# Patient Record
Sex: MALE | Race: BLACK OR AFRICAN AMERICAN | NOT HISPANIC OR LATINO | URBAN - METROPOLITAN AREA
[De-identification: names, ages, dates, MRNs, and addresses within clinical notes are randomized per-mention and may not be internally consistent; named-entity substitution may affect disease eponyms.]

---

## 2023-02-01 ENCOUNTER — EMERGENCY (EMERGENCY)
Facility: HOSPITAL | Age: 48
LOS: 1 days | Discharge: HOME | End: 2023-02-01
Attending: STUDENT IN AN ORGANIZED HEALTH CARE EDUCATION/TRAINING PROGRAM | Admitting: STUDENT IN AN ORGANIZED HEALTH CARE EDUCATION/TRAINING PROGRAM
Payer: COMMERCIAL

## 2023-02-01 VITALS
TEMPERATURE: 98 F | RESPIRATION RATE: 16 BRPM | OXYGEN SATURATION: 100 % | SYSTOLIC BLOOD PRESSURE: 198 MMHG | DIASTOLIC BLOOD PRESSURE: 97 MMHG | HEIGHT: 70 IN | WEIGHT: 227.08 LBS | HEART RATE: 65 BPM

## 2023-02-01 VITALS
RESPIRATION RATE: 18 BRPM | TEMPERATURE: 98 F | HEART RATE: 61 BPM | OXYGEN SATURATION: 99 % | SYSTOLIC BLOOD PRESSURE: 188 MMHG | DIASTOLIC BLOOD PRESSURE: 62 MMHG

## 2023-02-01 DIAGNOSIS — E11.621 TYPE 2 DIABETES MELLITUS WITH FOOT ULCER: ICD-10-CM

## 2023-02-01 DIAGNOSIS — R21 RASH AND OTHER NONSPECIFIC SKIN ERUPTION: ICD-10-CM

## 2023-02-01 DIAGNOSIS — L97.519 NON-PRESSURE CHRONIC ULCER OF OTHER PART OF RIGHT FOOT WITH UNSPECIFIED SEVERITY: ICD-10-CM

## 2023-02-01 DIAGNOSIS — Z79.84 LONG TERM (CURRENT) USE OF ORAL HYPOGLYCEMIC DRUGS: ICD-10-CM

## 2023-02-01 DIAGNOSIS — Z20.822 CONTACT WITH AND (SUSPECTED) EXPOSURE TO COVID-19: ICD-10-CM

## 2023-02-01 DIAGNOSIS — I10 ESSENTIAL (PRIMARY) HYPERTENSION: ICD-10-CM

## 2023-02-01 DIAGNOSIS — Z89.422 ACQUIRED ABSENCE OF OTHER LEFT TOE(S): ICD-10-CM

## 2023-02-01 LAB
ALBUMIN SERPL ELPH-MCNC: 4 G/DL — SIGNIFICANT CHANGE UP (ref 3.3–5)
ALP SERPL-CCNC: 65 U/L — SIGNIFICANT CHANGE UP (ref 40–120)
ALT FLD-CCNC: 6 U/L — LOW (ref 10–45)
ANION GAP SERPL CALC-SCNC: 6 MMOL/L — SIGNIFICANT CHANGE UP (ref 5–17)
APTT BLD: 33 SEC — SIGNIFICANT CHANGE UP (ref 27.5–35.5)
AST SERPL-CCNC: 12 U/L — SIGNIFICANT CHANGE UP (ref 10–40)
BASOPHILS # BLD AUTO: 0.04 K/UL — SIGNIFICANT CHANGE UP (ref 0–0.2)
BASOPHILS NFR BLD AUTO: 0.6 % — SIGNIFICANT CHANGE UP (ref 0–2)
BILIRUB SERPL-MCNC: 0.2 MG/DL — SIGNIFICANT CHANGE UP (ref 0.2–1.2)
BUN SERPL-MCNC: 11 MG/DL — SIGNIFICANT CHANGE UP (ref 7–23)
CALCIUM SERPL-MCNC: 9 MG/DL — SIGNIFICANT CHANGE UP (ref 8.4–10.5)
CHLORIDE SERPL-SCNC: 103 MMOL/L — SIGNIFICANT CHANGE UP (ref 96–108)
CO2 SERPL-SCNC: 30 MMOL/L — SIGNIFICANT CHANGE UP (ref 22–31)
CREAT SERPL-MCNC: 0.84 MG/DL — SIGNIFICANT CHANGE UP (ref 0.5–1.3)
CRP SERPL-MCNC: <3 MG/L — SIGNIFICANT CHANGE UP (ref 0–4)
EGFR: 108 ML/MIN/1.73M2 — SIGNIFICANT CHANGE UP
EOSINOPHIL # BLD AUTO: 0.05 K/UL — SIGNIFICANT CHANGE UP (ref 0–0.5)
EOSINOPHIL NFR BLD AUTO: 0.8 % — SIGNIFICANT CHANGE UP (ref 0–6)
ERYTHROCYTE [SEDIMENTATION RATE] IN BLOOD: 20 MM/HR — HIGH
GLUCOSE SERPL-MCNC: 190 MG/DL — HIGH (ref 70–99)
HCT VFR BLD CALC: 37.3 % — LOW (ref 39–50)
HGB BLD-MCNC: 11.2 G/DL — LOW (ref 13–17)
IMM GRANULOCYTES NFR BLD AUTO: 0.2 % — SIGNIFICANT CHANGE UP (ref 0–0.9)
INR BLD: 1.15 — SIGNIFICANT CHANGE UP (ref 0.88–1.16)
LYMPHOCYTES # BLD AUTO: 1.28 K/UL — SIGNIFICANT CHANGE UP (ref 1–3.3)
LYMPHOCYTES # BLD AUTO: 20.7 % — SIGNIFICANT CHANGE UP (ref 13–44)
MCHC RBC-ENTMCNC: 23.1 PG — LOW (ref 27–34)
MCHC RBC-ENTMCNC: 30 GM/DL — LOW (ref 32–36)
MCV RBC AUTO: 76.9 FL — LOW (ref 80–100)
MONOCYTES # BLD AUTO: 0.29 K/UL — SIGNIFICANT CHANGE UP (ref 0–0.9)
MONOCYTES NFR BLD AUTO: 4.7 % — SIGNIFICANT CHANGE UP (ref 2–14)
NEUTROPHILS # BLD AUTO: 4.5 K/UL — SIGNIFICANT CHANGE UP (ref 1.8–7.4)
NEUTROPHILS NFR BLD AUTO: 73 % — SIGNIFICANT CHANGE UP (ref 43–77)
NRBC # BLD: 0 /100 WBCS — SIGNIFICANT CHANGE UP (ref 0–0)
PLATELET # BLD AUTO: 275 K/UL — SIGNIFICANT CHANGE UP (ref 150–400)
POTASSIUM SERPL-MCNC: 3.8 MMOL/L — SIGNIFICANT CHANGE UP (ref 3.5–5.3)
POTASSIUM SERPL-SCNC: 3.8 MMOL/L — SIGNIFICANT CHANGE UP (ref 3.5–5.3)
PROT SERPL-MCNC: 8 G/DL — SIGNIFICANT CHANGE UP (ref 6–8.3)
PROTHROM AB SERPL-ACNC: 13.7 SEC — HIGH (ref 10.5–13.4)
RBC # BLD: 4.85 M/UL — SIGNIFICANT CHANGE UP (ref 4.2–5.8)
RBC # FLD: 14.2 % — SIGNIFICANT CHANGE UP (ref 10.3–14.5)
SARS-COV-2 RNA SPEC QL NAA+PROBE: NEGATIVE — SIGNIFICANT CHANGE UP
SODIUM SERPL-SCNC: 139 MMOL/L — SIGNIFICANT CHANGE UP (ref 135–145)
WBC # BLD: 6.17 K/UL — SIGNIFICANT CHANGE UP (ref 3.8–10.5)
WBC # FLD AUTO: 6.17 K/UL — SIGNIFICANT CHANGE UP (ref 3.8–10.5)

## 2023-02-01 PROCEDURE — 73630 X-RAY EXAM OF FOOT: CPT

## 2023-02-01 PROCEDURE — 99284 EMERGENCY DEPT VISIT MOD MDM: CPT

## 2023-02-01 PROCEDURE — 85610 PROTHROMBIN TIME: CPT

## 2023-02-01 PROCEDURE — 99285 EMERGENCY DEPT VISIT HI MDM: CPT

## 2023-02-01 PROCEDURE — 36415 COLL VENOUS BLD VENIPUNCTURE: CPT

## 2023-02-01 PROCEDURE — 86140 C-REACTIVE PROTEIN: CPT

## 2023-02-01 PROCEDURE — 73610 X-RAY EXAM OF ANKLE: CPT

## 2023-02-01 PROCEDURE — 73610 X-RAY EXAM OF ANKLE: CPT | Mod: 26,LT

## 2023-02-01 PROCEDURE — 85025 COMPLETE CBC W/AUTO DIFF WBC: CPT

## 2023-02-01 PROCEDURE — 82962 GLUCOSE BLOOD TEST: CPT

## 2023-02-01 PROCEDURE — 85730 THROMBOPLASTIN TIME PARTIAL: CPT

## 2023-02-01 PROCEDURE — 73630 X-RAY EXAM OF FOOT: CPT | Mod: 26,LT

## 2023-02-01 PROCEDURE — 87040 BLOOD CULTURE FOR BACTERIA: CPT

## 2023-02-01 PROCEDURE — 80053 COMPREHEN METABOLIC PANEL: CPT

## 2023-02-01 PROCEDURE — 85652 RBC SED RATE AUTOMATED: CPT

## 2023-02-01 PROCEDURE — 87635 SARS-COV-2 COVID-19 AMP PRB: CPT

## 2023-02-01 NOTE — ED ADULT NURSE REASSESSMENT NOTE - NS ED NURSE REASSESS COMMENT FT1
patient discharged to follow up with podiatry.  pt and wife requesting diabetic medication sent to pharmacy.  pt and wife patient discharged to follow up with podiatry.  pt and wife requesting diabetic medication sent to pharmacy.  pt and wife instructed that pt would need to contact MD that prescribed diabetic medication, ER MDs cannot prescribe unless pt was treated here for such.

## 2023-02-01 NOTE — ED ADULT TRIAGE NOTE - NS ED TRIAGE AVPU SCALE
Alert-The patient is alert, awake and responds to voice. The patient is oriented to time, place, and person. The triage nurse is able to obtain subjective information. Hemostasis: Electrocautery

## 2023-02-01 NOTE — ED PROVIDER NOTE - OBJECTIVE STATEMENT
47M c PMH of NIDDM (hasn't taken home meds in weeks) 47M c PMH of NIDDM (hasn't taken home meds in weeks), hx of DM foot ulcer (s/p toe amputation of L foot toe) p/w acute on chronic ulceration of R foot. Pt states he bumped his L foot on an object several months ago and since then has had ulceration of his L foot. Has not seen a podiatrist. Does not have pmd. denies f/c. is not on abx.

## 2023-02-01 NOTE — ED PROVIDER NOTE - NSFOLLOWUPINSTRUCTIONS_ED_ALL_ED_FT
Follow-up at [facility] within 1-2 weeks of discharge.   Address: [address] ([address] Floor, [place] [address].   Phone: [phone]  Please call for an appointment.    Please reach out to Danitza Yeager ([facility] ED clinical referral coordinator) to assist you with your follow-up appointment with a PRIMARY CARE DOCTOR.     Monday - Friday 11am-7pm  [phone]  [email or web address]     1. You were seen for foot pain. A copy of any of your resulted labs, imaging, and findings have been provided to you. Make sure to view any test results that may not have yet resulted at the time of your discharge by creating a FollowMyHealth account at: [email or web address] to sign up for FollowMyHealth. Please review all of your lab, imaging, and all other results in their entirety with your primary care doctor.   2. Continue to take your home medications as prescribed.   3. Follow up with your primary care doctor within 48 hours to assess the symptoms you were seen for in the emergency department and to review all results from your visit. If you don't have a doctor, call [phone] to make an appointment.  4. Return immediately to the emergency department for new, persistent, or worsening symptoms or signs. Return immediately to the emergency department if you have chest pain, shortness of breath, loss of consciousness, fever, or worsening pain.   5. For your for health, you should make healthy food choices and be physically active. Also, you should not smoke or use drugs, and you should not drink alcohol in excess. Please visit [email or web address] for resources and more information.

## 2023-02-01 NOTE — ED PROVIDER NOTE - PHYSICAL EXAMINATION
BLE: 5/5 motor strength bilat hip flexors, knee flexors and extensors, plantar and dorsiflexors, hallux flexors and extensors. sensation intact to light touch bilat L3-S1; 2+ DP pulses bilat; compartment soft  R foot: 1x1 cm area of ulceration on plantar surface under R toe with minimal surrounding white granulomatous tissue, no active exudate, no gangrene/warmth/fluctuance/induration

## 2023-02-01 NOTE — ED ADULT NURSE NOTE - NSIMPLEMENTINTERV_GEN_ALL_ED
Implemented All Universal Safety Interventions:  [place] to call system. Call bell, personal items and telephone within reach. Instruct patient to call for assistance. Room bathroom lighting operational. Non-slip footwear when patient is off stretcher. Physically safe environment: no spills, clutter or unnecessary equipment. Stretcher in lowest position, wheels locked, appropriate side rails in place.

## 2023-02-01 NOTE — ED ADULT TRIAGE NOTE - CHIEF COMPLAINT QUOTE
Pt presents to the ED c/o wound to left foot that began a few months ago, worsening over the past couple of days. Denies fever, chills, drainage.

## 2023-02-01 NOTE — ED PROVIDER NOTE - PATIENT PORTAL LINK FT
You can access the FollowMyHealth Patient Portal offered by [facility] by registering at the following website: [email or web address]. By joining [facility]’s FollowMyHealth portal, you will also be able to view your health information using other applications (apps) compatible with our system.

## 2023-02-01 NOTE — CONSULT NOTE ADULT - SUBJECTIVE AND OBJECTIVE BOX
Attending: Dr. Ya     Patient is a 47y old  Male who presents with a chief complaint of left foot wound    HPI: This is a 46y/o male pmhx DM2, HTN, non-compliant with medications presents to ED at the behest of his family member at bedside who states she is concerned about his foot because he does not take care of it. Pt states he had his 2nd toe on his left foot amputation 2 years ago for infection and then 1 year ago at [facility] they removed part of his 3rd toe for infection. He never followed up with his podiatrist after the surgery and the suture on the top of his left foot is still there. He believes the wound under his left foot has been there for "awhile" but he has not done anything about it because he has been working too much. Pt admits he is supposed to take metformin and a medication for his blood pressure but he stopped because he ran out of medication and did not follow up with his PCP. Pt denies any drainage from the wound, denies pain to the left foot. He states he feels well and denies any N/V/F/C/SOB.       Review of systems negative except per HPI and as stated below  General:	 no weakness; no fevers, no chills  Skin/Breast: no rash  Respiratory and Thorax: no SOB, no cough  Cardiovascular:	No chest pain  Gastrointestinal:	 no nausea, vomiting , diarrhea  Genitourinary:	no dysuria, no difficulty urinating, no hematuria  Musculoskeletal:	no weakness, no joint swelling/pain  Neurological:	no focal weakness/numbness  Endocrine:	no polyuria, no polydipsia    PAST MEDICAL & SURGICAL HISTORY:    Home Medications:    Allergies    No Known Allergies    Intolerances      FAMILY HISTORY: DM type 2 mother     Social History: Admits to marijuana use, denies tobacco use, admits to drinking ETOH socially       LABS                        11.2   6.17  )-----------( 275      ( 01 Feb 2023 18:26 )             37.3     02-01    139  |  103  |  11  ----------------------------<  190<H>  3.8   |  30  |  0.84    Ca    9.0      01 Feb 2023 18:26    TPro  8.0  /  Alb  4.0  /  TBili  0.2  /  DBili  x   /  AST  12  /  ALT  6<L>  /  AlkPhos  65  02-01    PT/INR - ( 01 Feb 2023 18:26 )   PT: 13.7 sec;   INR: 1.15          PTT - ( 01 Feb 2023 18:26 )  PTT:33.0 sec    Vital Signs Last 24 Hrs  T(C): 36.6 (01 Feb 2023 19:58), Max: 36.9 (01 Feb 2023 17:07)  T(F): 97.9 (01 Feb 2023 19:58), Max: 98.4 (01 Feb 2023 17:07)  HR: 59 (01 Feb 2023 19:58) (59 - 65)  BP: 188/93 (01 Feb 2023 19:58) (188/93 - 198/97)  BP(mean): --  RR: 18 (01 Feb 2023 19:58) (16 - 18)  SpO2: 100% (01 Feb 2023 19:58) (100% - 100%)    Parameters below as of 01 Feb 2023 19:58  Patient On (Oxygen Delivery Method): room air        PHYSICAL EXAM  General: NAD, AA0x3  Lower Extremity Focused:  Vasc: DP 2/4 b/l, PT 1/4 b/l, WWP, no peripheral edema   Derm: left foot 2X2cm sub-met 1 wound, granular base, neg PTB, sanguinous drainage, no malodor, no erythema, wound dose not tunnel, hyperkeratotic border, left foot Hyperkeratotic lesion 1st IPJ-no underlying wound s/p debridement left foot-1 nylon suture to dorsal surface of 3rd metatarsal     Neuro: Sensation diminished   MSK: 5/5 muscle strength in all compartments, S/P left 2nd digit amputation at level of MPJ, no tenderness to left foot       RADIOLOGY: Left foot x ray: WET READ: No soft tissue gas, no cortical erosions, s/p left 2nd digit amputation at MPJ, s/p left 3rd met head resection

## 2023-02-01 NOTE — ED PROVIDER NOTE - CLINICAL SUMMARY MEDICAL DECISION MAKING FREE TEXT BOX
47M c PMH of NIDDM (hasn't taken home meds in weeks), hx of DM foot ulcer (s/p toe amputation of L foot toe) p/w acute on chronic ulceration of R foot. On exam, /62, VS otherwise wnl, ulceration on R plantar foot, BLE are NVI.     ddx: dm foot ulcer vs osteo vs cellulitis vs gangrene.     Plan:  - labs, wbc wnl, esr 20  - xr: no free air/focal  lesion  - podiatry consulted and saw pt in ED. states ulcer appears chronic and does not need abx or admission. was debrided at bedside. pt can be discharged home with podiatry f/u

## 2023-02-01 NOTE — ED ADULT NURSE NOTE - OBJECTIVE STATEMENT
46yo male hx presents with long term wound of L foot that recently began to drain. denies fever/chills

## 2023-02-01 NOTE — CONSULT NOTE ADULT - ASSESSMENT
This is a 48y/o male pmhx DM2, HTN, non-compliant with medications presents to ED for chronic left foot wound. Podiatry consulted for wound evaluation and to secure follow up. VSS, wbc 6.17, CRP <3.0, PE wound is superficial and does not show any signs of infection at this time, X rays negative for soft tissue gas, low suspicion of osteomyelitis.     Plan:   -Pt seen and x rays reviewed   -Aseptic excisional debridement of left foot wound and hyperkeratotic lesion down to and including level of bleeding dermis.   -Wound flushed with saline and dressed with wet to dry dressing   -Pt educated on importance of diabetes management and diabetic foot care  -Pt understands he should clean the wound daily with water and apply clean dressing   -Pt states he understands the importance of following up outpt with podiatrist for routine foot care  -Pt instructed to return to ED if he sees any signs of infection such as but not limited to erythema, edema, drainage fever, chills, nausea, vomiting, SOB, increased pain   Recommend pt follow up with PCP to ensure diabetes is being managed appropriately   -Pt should follow up at [address] [place] [address] in 1-2 weeks     Follow-up at [facility] within 1-2 weeks of discharge.   Address: [address] [place] [address] ([address] Floor, [place] [address].   Phone: [phone]  Please call for an appointment.   This is a 48y/o male pmhx DM2, HTN, non-compliant with medications presents to ED for chronic left foot wound. Podiatry consulted for wound evaluation and to secure follow up. VSS, wbc 6.17, CRP <3.0, PE wound is superficial and does not show any signs of infection at this time, X rays negative for soft tissue gas, low suspicion of osteomyelitis.     Plan:   -Pt seen and x rays reviewed   -Aseptic excisional debridement of left foot wound and hyperkeratotic lesion down to and including level of bleeding dermis.   -Wound flushed with saline and dressed with wet to dry dressing   -Pt educated on importance of diabetes management and diabetic foot care  -Pt understands he should clean the wound daily with water and apply clean dressing   -Pt states he understands the importance of following up outpt with podiatrist for routine foot care  -Pt instructed to return to ED if he sees any signs of infection such as but not limited to erythema, edema, drainage fever, chills, nausea, vomiting, SOB, increased pain   Recommend pt follow up with PCP to ensure diabetes is being managed appropriately   -Pt should follow up at [address] [place] [address] in 1-2 weeks     Follow-up at [facility] within 1-2 weeks of discharge.   Address: [address] [place] [address] ([address], [place] [address].   Phone: [phone]  Please call for an appointment.    Plan D/W attending

## 2023-02-06 LAB
CULTURE RESULTS: SIGNIFICANT CHANGE UP
CULTURE RESULTS: SIGNIFICANT CHANGE UP
SPECIMEN SOURCE: SIGNIFICANT CHANGE UP
SPECIMEN SOURCE: SIGNIFICANT CHANGE UP

## 2023-04-25 ENCOUNTER — FORM ENCOUNTER (OUTPATIENT)
Age: 48
End: 2023-04-25

## 2023-04-25 ENCOUNTER — INPATIENT (INPATIENT)
Facility: HOSPITAL | Age: 48
LOS: 2 days | Discharge: HOME | DRG: 41 | End: 2023-04-28
Attending: PSYCHIATRY & NEUROLOGY | Admitting: PSYCHIATRY & NEUROLOGY
Payer: COMMERCIAL

## 2023-04-25 VITALS
RESPIRATION RATE: 18 BRPM | HEIGHT: 69 IN | SYSTOLIC BLOOD PRESSURE: 224 MMHG | TEMPERATURE: 98 F | DIASTOLIC BLOOD PRESSURE: 100 MMHG | WEIGHT: 169.54 LBS | HEART RATE: 67 BPM | OXYGEN SATURATION: 97 %

## 2023-04-25 PROBLEM — E11.9 TYPE 2 DIABETES MELLITUS WITHOUT COMPLICATIONS: Chronic | Status: ACTIVE | Noted: 2023-02-01

## 2023-04-25 LAB
ALBUMIN SERPL ELPH-MCNC: 3.6 G/DL — SIGNIFICANT CHANGE UP (ref 3.3–5)
ALP SERPL-CCNC: 56 U/L — SIGNIFICANT CHANGE UP (ref 40–120)
ALT FLD-CCNC: <5 U/L — LOW (ref 10–45)
AMPHET UR-MCNC: NEGATIVE — SIGNIFICANT CHANGE UP
ANION GAP SERPL CALC-SCNC: 11 MMOL/L — SIGNIFICANT CHANGE UP (ref 5–17)
APTT BLD: 25.2 SEC — LOW (ref 27.5–35.5)
AST SERPL-CCNC: 9 U/L — LOW (ref 10–40)
BARBITURATES UR SCN-MCNC: NEGATIVE — SIGNIFICANT CHANGE UP
BASOPHILS # BLD AUTO: 0.02 K/UL — SIGNIFICANT CHANGE UP (ref 0–0.2)
BASOPHILS NFR BLD AUTO: 0.2 % — SIGNIFICANT CHANGE UP (ref 0–2)
BENZODIAZ UR-MCNC: NEGATIVE — SIGNIFICANT CHANGE UP
BILIRUB SERPL-MCNC: 0.2 MG/DL — SIGNIFICANT CHANGE UP (ref 0.2–1.2)
BUN SERPL-MCNC: 15 MG/DL — SIGNIFICANT CHANGE UP (ref 7–23)
CALCIUM SERPL-MCNC: 8.2 MG/DL — LOW (ref 8.4–10.5)
CHLORIDE SERPL-SCNC: 100 MMOL/L — SIGNIFICANT CHANGE UP (ref 96–108)
CHOLEST SERPL-MCNC: 144 MG/DL — SIGNIFICANT CHANGE UP
CO2 SERPL-SCNC: 25 MMOL/L — SIGNIFICANT CHANGE UP (ref 22–31)
COCAINE METAB.OTHER UR-MCNC: NEGATIVE — SIGNIFICANT CHANGE UP
CREAT SERPL-MCNC: 0.88 MG/DL — SIGNIFICANT CHANGE UP (ref 0.5–1.3)
EGFR: 106 ML/MIN/1.73M2 — SIGNIFICANT CHANGE UP
EOSINOPHIL # BLD AUTO: 0.01 K/UL — SIGNIFICANT CHANGE UP (ref 0–0.5)
EOSINOPHIL NFR BLD AUTO: 0.1 % — SIGNIFICANT CHANGE UP (ref 0–6)
ETHANOL SERPL-MCNC: <10 MG/DL — SIGNIFICANT CHANGE UP (ref 0–10)
GLUCOSE BLDC GLUCOMTR-MCNC: 178 MG/DL — HIGH (ref 70–99)
GLUCOSE SERPL-MCNC: 310 MG/DL — HIGH (ref 70–99)
HCT VFR BLD CALC: 34.6 % — LOW (ref 39–50)
HDLC SERPL-MCNC: 52 MG/DL — SIGNIFICANT CHANGE UP
HGB BLD-MCNC: 10.5 G/DL — LOW (ref 13–17)
IMM GRANULOCYTES NFR BLD AUTO: 0.4 % — SIGNIFICANT CHANGE UP (ref 0–0.9)
INR BLD: 1.11 — SIGNIFICANT CHANGE UP (ref 0.88–1.16)
LIPID PNL WITH DIRECT LDL SERPL: 81 MG/DL — SIGNIFICANT CHANGE UP
LYMPHOCYTES # BLD AUTO: 0.57 K/UL — LOW (ref 1–3.3)
LYMPHOCYTES # BLD AUTO: 4.9 % — LOW (ref 13–44)
MCHC RBC-ENTMCNC: 23.5 PG — LOW (ref 27–34)
MCHC RBC-ENTMCNC: 30.3 GM/DL — LOW (ref 32–36)
MCV RBC AUTO: 77.6 FL — LOW (ref 80–100)
METHADONE UR-MCNC: NEGATIVE — SIGNIFICANT CHANGE UP
MONOCYTES # BLD AUTO: 0.29 K/UL — SIGNIFICANT CHANGE UP (ref 0–0.9)
MONOCYTES NFR BLD AUTO: 2.5 % — SIGNIFICANT CHANGE UP (ref 2–14)
NEUTROPHILS # BLD AUTO: 10.77 K/UL — HIGH (ref 1.8–7.4)
NEUTROPHILS NFR BLD AUTO: 91.9 % — HIGH (ref 43–77)
NON HDL CHOLESTEROL: 92 MG/DL — SIGNIFICANT CHANGE UP
NRBC # BLD: 0 /100 WBCS — SIGNIFICANT CHANGE UP (ref 0–0)
OPIATES UR-MCNC: NEGATIVE — SIGNIFICANT CHANGE UP
PCP SPEC-MCNC: SIGNIFICANT CHANGE UP
PCP UR-MCNC: NEGATIVE — SIGNIFICANT CHANGE UP
PLATELET # BLD AUTO: 192 K/UL — SIGNIFICANT CHANGE UP (ref 150–400)
POTASSIUM SERPL-MCNC: 4.1 MMOL/L — SIGNIFICANT CHANGE UP (ref 3.5–5.3)
POTASSIUM SERPL-SCNC: 4.1 MMOL/L — SIGNIFICANT CHANGE UP (ref 3.5–5.3)
PROT SERPL-MCNC: 6.7 G/DL — SIGNIFICANT CHANGE UP (ref 6–8.3)
PROTHROM AB SERPL-ACNC: 13.2 SEC — SIGNIFICANT CHANGE UP (ref 10.5–13.4)
RBC # BLD: 4.46 M/UL — SIGNIFICANT CHANGE UP (ref 4.2–5.8)
RBC # FLD: 14.3 % — SIGNIFICANT CHANGE UP (ref 10.3–14.5)
SODIUM SERPL-SCNC: 136 MMOL/L — SIGNIFICANT CHANGE UP (ref 135–145)
THC UR QL: POSITIVE
TRIGL SERPL-MCNC: 57 MG/DL — SIGNIFICANT CHANGE UP
TROPONIN T SERPL-MCNC: 0.01 NG/ML — SIGNIFICANT CHANGE UP (ref 0–0.01)
WBC # BLD: 11.71 K/UL — HIGH (ref 3.8–10.5)
WBC # FLD AUTO: 11.71 K/UL — HIGH (ref 3.8–10.5)

## 2023-04-25 PROCEDURE — 70498 CT ANGIOGRAPHY NECK: CPT | Mod: 26,MA

## 2023-04-25 PROCEDURE — 71045 X-RAY EXAM CHEST 1 VIEW: CPT | Mod: 26

## 2023-04-25 PROCEDURE — 70450 CT HEAD/BRAIN W/O DYE: CPT | Mod: 26,MA

## 2023-04-25 PROCEDURE — 0042T: CPT | Mod: MA

## 2023-04-25 PROCEDURE — 70496 CT ANGIOGRAPHY HEAD: CPT | Mod: 26,MA

## 2023-04-25 PROCEDURE — 99285 EMERGENCY DEPT VISIT HI MDM: CPT

## 2023-04-25 RX ORDER — ATORVASTATIN CALCIUM 80 MG/1
80 TABLET, FILM COATED ORAL AT BEDTIME
Refills: 0 | Status: DISCONTINUED | OUTPATIENT
Start: 2023-04-25 | End: 2023-04-28

## 2023-04-25 RX ORDER — ASPIRIN/CALCIUM CARB/MAGNESIUM 324 MG
81 TABLET ORAL DAILY
Refills: 0 | Status: DISCONTINUED | OUTPATIENT
Start: 2023-04-26 | End: 2023-04-28

## 2023-04-25 RX ORDER — SODIUM CHLORIDE 9 MG/ML
1000 INJECTION, SOLUTION INTRAVENOUS
Refills: 0 | Status: DISCONTINUED | OUTPATIENT
Start: 2023-04-25 | End: 2023-04-28

## 2023-04-25 RX ORDER — GLUCAGON INJECTION, SOLUTION 0.5 MG/.1ML
1 INJECTION, SOLUTION SUBCUTANEOUS ONCE
Refills: 0 | Status: DISCONTINUED | OUTPATIENT
Start: 2023-04-25 | End: 2023-04-28

## 2023-04-25 RX ORDER — INSULIN LISPRO 100/ML
VIAL (ML) SUBCUTANEOUS
Refills: 0 | Status: DISCONTINUED | OUTPATIENT
Start: 2023-04-25 | End: 2023-04-28

## 2023-04-25 RX ORDER — HYDRALAZINE HCL 50 MG
10 TABLET ORAL ONCE
Refills: 0 | Status: DISCONTINUED | OUTPATIENT
Start: 2023-04-25 | End: 2023-04-25

## 2023-04-25 RX ORDER — DEXTROSE 50 % IN WATER 50 %
12.5 SYRINGE (ML) INTRAVENOUS ONCE
Refills: 0 | Status: DISCONTINUED | OUTPATIENT
Start: 2023-04-25 | End: 2023-04-28

## 2023-04-25 RX ORDER — ENOXAPARIN SODIUM 100 MG/ML
40 INJECTION SUBCUTANEOUS EVERY 24 HOURS
Refills: 0 | Status: DISCONTINUED | OUTPATIENT
Start: 2023-04-25 | End: 2023-04-28

## 2023-04-25 RX ORDER — LABETALOL HCL 100 MG
10 TABLET ORAL ONCE
Refills: 0 | Status: COMPLETED | OUTPATIENT
Start: 2023-04-25 | End: 2023-04-25

## 2023-04-25 RX ORDER — DEXTROSE 50 % IN WATER 50 %
15 SYRINGE (ML) INTRAVENOUS ONCE
Refills: 0 | Status: DISCONTINUED | OUTPATIENT
Start: 2023-04-25 | End: 2023-04-28

## 2023-04-25 RX ORDER — DEXTROSE 50 % IN WATER 50 %
25 SYRINGE (ML) INTRAVENOUS ONCE
Refills: 0 | Status: DISCONTINUED | OUTPATIENT
Start: 2023-04-25 | End: 2023-04-28

## 2023-04-25 RX ORDER — HYDRALAZINE HCL 50 MG
10 TABLET ORAL ONCE
Refills: 0 | Status: COMPLETED | OUTPATIENT
Start: 2023-04-25 | End: 2023-04-25

## 2023-04-25 RX ADMIN — Medication 10 MILLIGRAM(S): at 19:02

## 2023-04-25 RX ADMIN — ATORVASTATIN CALCIUM 80 MILLIGRAM(S): 80 TABLET, FILM COATED ORAL at 22:10

## 2023-04-25 RX ADMIN — Medication 2: at 22:10

## 2023-04-25 RX ADMIN — Medication 10 MILLIGRAM(S): at 20:03

## 2023-04-25 RX ADMIN — ENOXAPARIN SODIUM 40 MILLIGRAM(S): 100 INJECTION SUBCUTANEOUS at 22:11

## 2023-04-25 NOTE — ED PROVIDER NOTE - CLINICAL SUMMARY MEDICAL DECISION MAKING FREE TEXT BOX
Patient with severe hypertension and new neurologic dysfunction concerning for stroke versus hypertensive emergency.  Plan check labs, imaging, stroke code upon arrival and likely admit.  Patient is out of window for tenecteplase.

## 2023-04-25 NOTE — H&P ADULT - ASSESSMENT
47y Male with PMHx of HTN, HLD, DM, CVA in 2022 with residual left sided deficits(not on aspirin), ?seizure history not on any meds was sent in from ambulatory office after a sudden onset headache and syncopal episode with confusion. History was obtained from chart review as the patient is not a good historian and emergency contact was unable to be reached. Stroke code called on arrival to ED. NIHSS 4. CTH w/ chronic infarcts left occipital, right basal ganglia and thalamic region. CTA w/ severe stenosis of the left P2, moderate to severe focal narrowing of the left superior M2, short segmental mild to moderate narrowing of the right inferior M2. CTP w/ elevated Tmax in the right frontal lobe that may be artifact. Patient out of the window for Tenecteplase and not a candidate for mechanical thrombectomy. Unknown was patient's prior stroke workup was and why he is not on any antiplatelet therapy. Admitted to stroke service for further workup with MRI and EEG.    Neuro  #CVA workup  - s/p 325mg aspirin in the field  - continue aspirin 81mg   - continue atorvastatin 80mg daily  - q4hr stroke neuro checks and vitals  - obtain MRI Brain without contrast  - Stroke Code HCT Results: chronic infarcts left occipital, right basal ganglia and thalamic region  - Stroke Code CTA Results: severe stenosis of the left P2, moderate to severe focal narrowing of the left superior M2, short segmental mild to moderate narrowing of the right inferior M2  - Stroke education  - EEG to r/o seizures- per outside documentation patient has history of seizures, not on any meds    Cards  #HTN  - permissive hypertension, Goal -180  - hold home blood pressure medication for now  - Patient hypertensive in ED, labetalol PRN to maintain SBP < 180  - obtain TTE with bubble  - Stroke Code EKG Results: NSR    #HLD  - high dose statin as above in CVA  - LDL results: pending    Pulm  - call provider if SPO2 < 94%    GI  #Nutrition/Fluids/Electrolytes   - replete K<4 and Mg <2  - Diet: Consistent Carb/DASH  - IVF: none for now    Renal  - LIZ    Infectious Disease  - Stroke Code CXR results: No acute pathology    Endocrine  #DM  - A1C results: pending  - ISS    - TSH results: pending    DVT Prophylaxis  - lovenox sq for DVT prophylaxis   - SCDs for DVT prophylaxis       IDR Goals: Goals reviewed at interdisciplinary rounds with case management, social work, physical therapy, occupational therapy, and speech language pathology.   Please see specific therapy  notes for in depth goals.  Dispo: Pending PT/OT evals     Discussed daily hospital plans and goals with patient and family at bedside.    Discussed with Neurology Attending Dr. Ene Hernandez 47y Male with PMHx of HTN, HLD, DM, CVA in 2022 with residual left sided deficits(not on aspirin), ?seizure history not on any meds was sent in from ambulatory office after a sudden onset headache and syncopal episode with confusion. History was obtained from chart review as the patient is not a good historian and emergency contact was unable to be reached. Stroke code called on arrival to ED. NIHSS 4. CTH w/ chronic infarcts left occipital, right basal ganglia and thalamic region. CTA w/ severe stenosis of the left P2, moderate to severe focal narrowing of the left superior M2, short segmental mild to moderate narrowing of the right inferior M2. CTP w/ elevated Tmax in the right frontal lobe that may be artifact. Patient out of the window for Tenecteplase and not a candidate for mechanical thrombectomy. Unknown was patient's prior stroke workup was and why he is not on any antiplatelet therapy. Admitted to stroke service for further workup with MRI and EEG.    Neuro  #CVA workup  - s/p 325mg aspirin in the field  - continue aspirin 81mg   - continue atorvastatin 80mg daily  - q4hr stroke neuro checks and vitals  - obtain MRI Brain without contrast  - Stroke Code HCT Results: chronic infarcts left occipital, right basal ganglia and thalamic region  - Stroke Code CTA Results: severe stenosis of the left P2, moderate to severe focal narrowing of the left superior M2, short segmental mild to moderate narrowing of the right inferior M2  - Stroke education  - EEG to r/o seizures- per outside documentation patient has history of seizures, not on any meds    Cards  #HTN  - permissive hypertension, Goal -180  - hold home blood pressure medication for now  - Patient hypertensive in ED, hydralazine PRN to maintain SBP < 180  - obtain TTE with bubble  - Stroke Code EKG Results: NSR    #HLD  - high dose statin as above in CVA  - LDL results: pending    Pulm  - call provider if SPO2 < 94%    GI  #Nutrition/Fluids/Electrolytes   - replete K<4 and Mg <2  - Diet: Consistent Carb/DASH  - IVF: none for now    Renal  - LIZ    Infectious Disease  - Stroke Code CXR results: No acute pathology    Endocrine  #DM  - A1C results: pending  - ISS    - TSH results: pending    DVT Prophylaxis  - lovenox sq for DVT prophylaxis   - SCDs for DVT prophylaxis       IDR Goals: Goals reviewed at interdisciplinary rounds with case management, social work, physical therapy, occupational therapy, and speech language pathology.   Please see specific therapy  notes for in depth goals.  Dispo: Pending PT/OT evals     Discussed daily hospital plans and goals with patient and family at bedside.    Discussed with Neurology Attending Dr. Ene Hernandez

## 2023-04-25 NOTE — H&P ADULT - NSHPPHYSICALEXAM_GEN_ALL_CORE
Vital Signs Last 24 Hrs  T(C): 36.9 (25 Apr 2023 15:45), Max: 36.9 (25 Apr 2023 15:45)  T(F): 98.4 (25 Apr 2023 15:45), Max: 98.4 (25 Apr 2023 15:45)  HR: 65 (25 Apr 2023 15:53) (65 - 69)  BP: 203/103 (25 Apr 2023 15:53) (203/103 - 227/104)  BP(mean): --  RR: 16 (25 Apr 2023 15:53) (16 - 18)  SpO2: 98% (25 Apr 2023 15:53) (96% - 98%)    Parameters below as of 25 Apr 2023 15:53  Patient On (Oxygen Delivery Method): room air        Physical exam:  General: No acute distress, awake and alert  Cardiovascular: Regular rate and rhythm, no murmurs, rubs, or gallops. No bruits  Pulmonary: Anterior breath sounds clear bilaterally, no crackles or wheezing. No use of accessory muscles  GI: Abdomen soft, non-tender, non-distended    Neurologic:  -Mental status: Lethargic, initially oriented to self only. On follow up, oriented to person, place, and time. Speech is disfluent initially but has intact naming, repetition, and comprehension, no dysarthria.  Follows commands. Attention/concentration intact. Fund of knowledge appropriate.  -Cranial nerves:   II: ?Left visual field cut although patient is difficult to test d/t cooperation  III, IV, VI: Extraocular movements are intact without nystagmus. Pupils equally round and reactive to light  V:  Facial sensation V1-V3 equal and intact   VII: Initially appears to have left facial droop, corrects with effort  Motor: Normal bulk and tone. Right upper extremity 5/5, left upper extremity 4/5 with mild drift.  Satellites around left upper extremity. Bilateral lower extremities 5/5.  Sensation: Intact to light touch bilaterally. No neglect or extinction on double simultaneous testing.  Coordination: No dysmetria on finger-to-nose bilaterally out of proportion to weakness      NIHSS: 4

## 2023-04-25 NOTE — ED ADULT NURSE NOTE - HIV OFFER
Referred by: Darek Mora DO; Medical Diagnosis (from order):    Diagnosis Information      Diagnosis    V54.16 (ICD-9-CM) - S82.001D (ICD-10-CM) - Closed nondisplaced fracture of right patella with routine healing, unspecified fracture morphology, subsequent encounter           Physical Therapy -  Daily Treatment Note    Visit:  14   Next referring provider appointment: 2/27/2020        SUBJECTIVE                                                                                                             Patient states the knee is doing fine but it does get fairly swollen after walking or being on it for a couple hours. Patient states he still feels the pain in the distal medial and lateral thigh.          OBJECTIVE                                                                                                                     Range of Motion (ROM) (norms in parentheses, measurement in degrees unless noted):     Knee Flexion (150): Right: Passive: 110 pain  Knee Extension (0-10): Right: Active: 1        TREATMENT                                                                                                                  Therapeutic Exercise:  Passive range of motion of the right knee into flexion in short sitting and supine  Seated contract relax into knee flexion with therapist overpressure     Passive prone quadriceps stretch with 10 second holds x 5     Bike for ROM x 10 minutes (full revolutions)  Wall heel slide with 5 second holds x 10  Heel slides with 3-5 second holds with stretch strap x 10  Seated heel slides with 3-5 second holds with stretch strap  x 15  Quad set with 5 second holds x 20  Short arc quadriceps with 5 second holds x 10 (3-4/10 achy/tight pain; some assistance to get additional movement)  Seated knee flexion with left leg assist with 3 second holds x 10  Standing knee flexion stretch on step with 5 second holds x 10   Standing hamstring curl with 2 second holds x 10   Terminal Knee  Extension ball at wall 10 x 5 seconds  Prone hang with 2# ankle weight x 2 minutes    New:  Prone quad sets with 5 second holds x 10    Surgery: 12 week status post ORIF of right patella as of 2/24/2020; DOS: 12/2/2019. Knee manipulation---DOS: 2/10/2020    Manual Therapy:  Soft tissue mobilization to the right knee musculature including hamstrings and quadriceps  Patellar mobilizations in all planes  Patellar mobilization inferior at end range of flexion      Skilled input: verbal instruction/cues and tactile instruction/cues    Home Exercise Program: (*above indicates provided as part of home exercise program)    Home exercise program (last updated (2/21/2020): Patient issued written home exercise program.  Patient demonstrated exercises correctly and was instructed to call with questions. Patient instructed to terminate home exercises if they become painful.   4x/day:  Quad sets with 5 second holds x 10  Heel slides with 3 second holds x 10  Seated heel slides with 3-5 second holds x 10  Long sitting isometric hip adduction with ball at knees with 3 second holds x 10  Seated knee flexion with left leg assist with 5 second holds x 10  Standing knee flexion stretch on bottom step with 5 second holds x 15  Wall heel slides (other leg assist to get back up wall at needed) x 10  Terminal knee extension with towel roll behind the knee at wall with 5 second holds x 10      ASSESSMENT                                                                                                             Patient's range of motion is about the same as last week; 110 degrees of flexion and lacking 1 degree of knee extension. Less pain present with short arc quads; however, patient has a very difficult time achieving end-range extension and with eccentric lowering. Patient appeared to have a little less swelling through the knee today but patient states it does not take much for it to come back.   Patient Education:   Results of above  outlined education: Verbalizes understanding     PLAN                                                                                                                             Suggestions for next session as indicated: Progress per plan of care. Reassess next session- patient sees ortho on 02/27/2020.       Procedures and total treatment time documented Time Entry flowsheet.    Electronically Signed by:    Ya Negron PT, DPT , 2/25/2020       Previously Declined (within the last year)

## 2023-04-25 NOTE — ED PROVIDER NOTE - PHYSICAL EXAMINATION
· Intubated	No  · Sedated/Unresponsive	No  · NIH Stroke Scale: LOC	(1) Not alert; but arousable by minor stimulation to obey, answer, or respond  · NIH Stroke Scale: LOC Question	(2) Answers neither question correctly  · NIH Stroke Scale: LOC Command	(0) Performs both tasks correctly  · NIH Stroke Scale: Gaze	(0) Normal  · NIH Stroke Scale: Visual	(0) No visual loss  · NIH Stroke Scale: Facial	(0) Normal symmetrical movements  · NIH Stroke Scale: Arm Left	(1) Drift; limb holds 90 (or 45) degrees, but drifts down before full 10 seconds; does not hit bed or other support  · NIH Stroke Scale: Arm Right	(0) No drift; limb holds 90 (or 45) degrees for full 10 secs  · NIH Stroke Scale: Leg Left	(0) No drift; leg holds 30 degree position for full 5 secs  · NIH Stroke Scale: Leg Right	(0) No drift; leg holds 30 degree position for full 5 secs  · NIH Stroke Scale: Ataxia	(0) Absent  · NIH Stroke Scale: Sensory	(0) Normal; no sensory loss  · NIH Stroke Scale: Language	(0) No aphasia; normal  · NIH Stroke Scale: Dysarthria	(0) Normal  · NIH Stroke Scale: Extinct Inattention	(0) No abnormality  · NIH Stroke Scale: Total	4

## 2023-04-25 NOTE — ED ADULT NURSE NOTE - CHIEF COMPLAINT QUOTE
Pt BIB EMS for headache starting at 10AM today. While pt was at PCP office pt "syncopized." Pt has been confused since. Pt has hx of stroke and b/p is 224/100

## 2023-04-25 NOTE — H&P ADULT - NSHPSOCIALHISTORY_GEN_ALL_CORE
SOCIAL HISTORY:      Smoking status: Unknown  Drinking: Unknown  Drug Use: Marijuana use (recorded on outpatient record)

## 2023-04-25 NOTE — ED PROVIDER NOTE - OBJECTIVE STATEMENT
47y Male with PMHx of HTN, HLD, DM, CVA in 2022 with residual left sided deficits(not on aspirin), ?seizure history not on any meds was sent in from ambulatory office after a syncopal episode and confusion. History was obtained from chart review as the patient is not a good historian and emergency contact was unable to be reached. Reportedly his ex-wife picked him up around 10am this morning for an appointment and the patient was complaining of a headache. At the office the patient then had a syncopal episode and when he woke up he was confused. Unknown how long he was unresponsive. When EMS arrive the patient was hypertensive to the 200's (not on any BP meds at home).

## 2023-04-25 NOTE — PATIENT PROFILE ADULT - FALL HARM RISK - HARM RISK INTERVENTIONS
Assistance with ambulation/Assistance OOB with selected safe patient handling equipment/Communicate Risk of Fall with Harm to all staff/Monitor gait and stability/Reinforce activity limits and safety measures with patient and family/Sit up slowly, dangle for a short time, stand at bedside before walking/Tailored Fall Risk Interventions/Visual Cue: Yellow wristband and red socks/Bed in lowest position, wheels locked, appropriate side rails in place/Call bell, personal items and telephone in reach/Instruct patient to call for assistance before getting out of bed or chair/Non-slip footwear when patient is out of bed/[place] to call system/Physically safe environment - no spills, clutter or unnecessary equipment/Purposeful Proactive Rounding/Room/bathroom lighting operational, light cord in reach

## 2023-04-25 NOTE — H&P ADULT - NSHPLABSRESULTS_GEN_ALL_CORE
Fingerstick Blood Glucose: CAPILLARY BLOOD GLUCOSE  307 (25 Apr 2023 17:48)      POCT Blood Glucose.: 307 mg/dL (25 Apr 2023 14:25)    LABS:                        10.5   11.71 )-----------( 192      ( 25 Apr 2023 15:06 )             34.6     04-25    136  |  100  |  15  ----------------------------<  310<H>  4.1   |  25  |  0.88    Ca    8.2<L>      25 Apr 2023 15:06    TPro  6.7  /  Alb  3.6  /  TBili  0.2  /  DBili  x   /  AST  9<L>  /  ALT  <5<L>  /  AlkPhos  56  04-25    PT/INR - ( 25 Apr 2023 15:06 )   PT: 13.2 sec;   INR: 1.11          PTT - ( 25 Apr 2023 15:06 )  PTT:25.2 sec  CARDIAC MARKERS ( 25 Apr 2023 15:06 )  x     / 0.01 ng/mL / x     / x     / x

## 2023-04-25 NOTE — ED ADULT TRIAGE NOTE - CHIEF COMPLAINT QUOTE
Pt BIB EMS for headache starting at 10AM today. While pt was at PCP office pt "syncopized." Pt has been confused since. Pt has hx of stroke and b/p is 224/100 Pt BIB EMS for headache starting at 10AM today. While pt was at PCP office pt "syncopized." Pt has been confused since. Pt has hx of stroke and b/p is 224/100.

## 2023-04-25 NOTE — H&P ADULT - HISTORY OF PRESENT ILLNESS
**STROKE HPI***    HPI: 47y Male with PMHx of HTN, HLD, DM, CVA in 2022 with residual left sided deficits(not on aspirin), ?seizure history not on any meds was sent in from ambulatory office after a syncopal episode and confusion. History was obtained from chart review as the patient is not a good historian and emergency contact was unable to be reached. Reportedly his ex-wife picked him up around 10am this morning for an appointment and the patient was complaining of a headache. At the office the patient then had a syncopal episode and when he woke up he was confused. Unknown how long he was unresponsive. When EMS arrive the patient was hypertensive to the 200's (not on any BP meds at home). Stroke code was called on arrival to the ED. NIHSS 4. CTH w/ chronic infarcts left occipital, right basal ganglia and thalamic region. CTA w/ severe stenosis of the left P2, moderate to severe focal narrowing of the left superior M2, short segmental mild to moderate narrowing of the right inferior M2. CTP w/ elevated Tmax in the right frontal lobe that may be artifact. Patient out of the window for Tenecteplase and not a candidate for mechanical thrombectomy.   On reassessment patient was much more alert and oriented, able to answer more questions appropriately with mild confusion. Remembers that he had his prior stroke at [facility] but does not remember being told to take aspirin and only takes it intermittently when he has headaches.     PAST MEDICAL & SURGICAL HISTORY:  DM (diabetes mellitus)          FAMILY HISTORY:              T(C): 36.9 (04-25-23 @ 15:45), Max: 36.9 (04-25-23 @ 15:45)  HR: 65 (04-25-23 @ 15:53) (65 - 69)  BP: 203/103 (04-25-23 @ 15:53) (203/103 - 227/104)  RR: 16 (04-25-23 @ 15:53) (16 - 18)  SpO2: 98% (04-25-23 @ 15:53) (96% - 98%)    MEDICATION RECONCILIATION   MEDICATIONS  (STANDING):  atorvastatin 80 milliGRAM(s) Oral at bedtime    MEDICATIONS  (PRN):    Allergies    No Known Allergies    Intolerances            RADIOLOGY & ADDITIONAL STUDIES:    HCT:   < from: CT Brain Stroke Protocol (04.25.23 @ 14:32) >  Impression: No acute intracranial injury. Left occipital chronic   infarction. Right basal ganglia and thalamic chronic lacunar infarcts.      < from: CT Angio Brain Stroke Protocol  w/ IV Cont (04.25.23 @ 14:39) >  Impression:  Short segmental mild to moderate narrowing of the right inferior division   of the M2 segment of the middle cerebral artery.    Moderate to severe focal narrowing of the left superior division of the   M2 segment of the middle cerebral artery    Severe stenosis of the left P2 segment of the PCA.    Normal CTA of the extracranial circulation. No evidence of carotid   stenosis.    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 Recommend MRI   with contrast for further characterization.    < from: CT Brain Perfusion Maps Stroke (04.25.23 @ 14:39) >  IMPRESSION: Small volume reported elevated Tmax in the right frontal lobe   which may be artifactual. Otherwise no reported abnormal perfusion   metrics.

## 2023-04-26 LAB
A1C WITH ESTIMATED AVERAGE GLUCOSE RESULT: 7 % — HIGH (ref 4–5.6)
ANION GAP SERPL CALC-SCNC: 12 MMOL/L — SIGNIFICANT CHANGE UP (ref 5–17)
BUN SERPL-MCNC: 16 MG/DL — SIGNIFICANT CHANGE UP (ref 7–23)
CALCIUM SERPL-MCNC: 8.8 MG/DL — SIGNIFICANT CHANGE UP (ref 8.4–10.5)
CHLORIDE SERPL-SCNC: 101 MMOL/L — SIGNIFICANT CHANGE UP (ref 96–108)
CHOLEST SERPL-MCNC: 157 MG/DL — SIGNIFICANT CHANGE UP
CO2 SERPL-SCNC: 26 MMOL/L — SIGNIFICANT CHANGE UP (ref 22–31)
CREAT SERPL-MCNC: 0.91 MG/DL — SIGNIFICANT CHANGE UP (ref 0.5–1.3)
EGFR: 104 ML/MIN/1.73M2 — SIGNIFICANT CHANGE UP
ESTIMATED AVERAGE GLUCOSE: 154 MG/DL — HIGH (ref 68–114)
GLUCOSE BLDC GLUCOMTR-MCNC: 139 MG/DL — HIGH (ref 70–99)
GLUCOSE BLDC GLUCOMTR-MCNC: 254 MG/DL — HIGH (ref 70–99)
GLUCOSE BLDC GLUCOMTR-MCNC: 89 MG/DL — SIGNIFICANT CHANGE UP (ref 70–99)
GLUCOSE BLDC GLUCOMTR-MCNC: 97 MG/DL — SIGNIFICANT CHANGE UP (ref 70–99)
GLUCOSE SERPL-MCNC: 97 MG/DL — SIGNIFICANT CHANGE UP (ref 70–99)
HCT VFR BLD CALC: 36.2 % — LOW (ref 39–50)
HDLC SERPL-MCNC: 60 MG/DL — SIGNIFICANT CHANGE UP
HGB BLD-MCNC: 11 G/DL — LOW (ref 13–17)
LIPID PNL WITH DIRECT LDL SERPL: 89 MG/DL — SIGNIFICANT CHANGE UP
MAGNESIUM SERPL-MCNC: 1.8 MG/DL — SIGNIFICANT CHANGE UP (ref 1.6–2.6)
MCHC RBC-ENTMCNC: 23 PG — LOW (ref 27–34)
MCHC RBC-ENTMCNC: 30.4 GM/DL — LOW (ref 32–36)
MCV RBC AUTO: 75.7 FL — LOW (ref 80–100)
NON HDL CHOLESTEROL: 97 MG/DL — SIGNIFICANT CHANGE UP
NRBC # BLD: 0 /100 WBCS — SIGNIFICANT CHANGE UP (ref 0–0)
PHOSPHATE SERPL-MCNC: 4 MG/DL — SIGNIFICANT CHANGE UP (ref 2.5–4.5)
PLATELET # BLD AUTO: 247 K/UL — SIGNIFICANT CHANGE UP (ref 150–400)
POTASSIUM SERPL-MCNC: 3.6 MMOL/L — SIGNIFICANT CHANGE UP (ref 3.5–5.3)
POTASSIUM SERPL-SCNC: 3.6 MMOL/L — SIGNIFICANT CHANGE UP (ref 3.5–5.3)
RBC # BLD: 4.78 M/UL — SIGNIFICANT CHANGE UP (ref 4.2–5.8)
RBC # FLD: 14.2 % — SIGNIFICANT CHANGE UP (ref 10.3–14.5)
SODIUM SERPL-SCNC: 139 MMOL/L — SIGNIFICANT CHANGE UP (ref 135–145)
TRIGL SERPL-MCNC: 40 MG/DL — SIGNIFICANT CHANGE UP
TSH SERPL-MCNC: 2.24 UIU/ML — SIGNIFICANT CHANGE UP (ref 0.27–4.2)
WBC # BLD: 6.94 K/UL — SIGNIFICANT CHANGE UP (ref 3.8–10.5)
WBC # FLD AUTO: 6.94 K/UL — SIGNIFICANT CHANGE UP (ref 3.8–10.5)

## 2023-04-26 PROCEDURE — 99223 1ST HOSP IP/OBS HIGH 75: CPT

## 2023-04-26 PROCEDURE — 99254 IP/OBS CNSLTJ NEW/EST MOD 60: CPT

## 2023-04-26 PROCEDURE — 70553 MRI BRAIN STEM W/O & W/DYE: CPT | Mod: 26

## 2023-04-26 RX ORDER — CHOLECALCIFEROL (VITAMIN D3) 125 MCG
1 CAPSULE ORAL
Refills: 0 | DISCHARGE

## 2023-04-26 RX ORDER — MAGNESIUM SULFATE 500 MG/ML
2 VIAL (ML) INJECTION ONCE
Refills: 0 | Status: COMPLETED | OUTPATIENT
Start: 2023-04-26 | End: 2023-04-26

## 2023-04-26 RX ADMIN — Medication 25 GRAM(S): at 09:54

## 2023-04-26 RX ADMIN — Medication 6: at 11:43

## 2023-04-26 RX ADMIN — ATORVASTATIN CALCIUM 80 MILLIGRAM(S): 80 TABLET, FILM COATED ORAL at 21:45

## 2023-04-26 RX ADMIN — Medication 81 MILLIGRAM(S): at 11:38

## 2023-04-26 RX ADMIN — ENOXAPARIN SODIUM 40 MILLIGRAM(S): 100 INJECTION SUBCUTANEOUS at 21:45

## 2023-04-26 NOTE — EEG REPORT - NS EEG TEXT BOX
[facility] Department of Neurology  Inpatient Continuous video-Electroencephalogram    Patient Name:	TREHERNE, RUSHONE    :	1975  MRN:	[id number]    Study Start Date/Time:  2023, 12:08:26 AM  Study End Date/Time:    Referred by: Ene Hernandez MD    Brief Clinical History:  RUSHONE TREHERNE is a 47 year old Male; study performed to investigate for seizures or markers of epilepsy.    Diagnosis Code:   R56.9 convulsions/seizure  The live video was: unmonitored.    Pertinent Medications:  n/a    Acquisition Details:  Electroencephalography was acquired using a minimum of 21 channels on an [employer] system v 9.3.1 with electrode placement according to the standard International 10-20 system following ACNS (American Clinical Neurophysiology Society) guidelines for Long-Term Video EEG monitoring.  Anterior temporal T1 and T2 electrodes were utilized whenever possible.   The [employer] automated spike & seizure detections were all reviewed in detail, in addition to extensive portions of raw EEG.      Day 1: 2023 @ 12:08:26 AM to morning @ 07:00 am  Background:  continuous, with predominantly alpha and beta frequencies.  Symmetry:  No persistent asymmetries of voltage or frequency.  Posterior Dominant Rhythm:  9.5 Hz symmetric, well-organized, and well-modulated.  Organization: Appropriate anterior-posterior gradient.  Voltage:  Normal (20+ uV)  Variability: Yes. 		Reactivity: Yes.  N2 sleep: Symmetric, synchronous spindles and K complexes.  Spontaneous Activity:  No epileptiform discharges.  Periodic/rhythmic activity:  None  Events:  No electrographic seizures or significant clinical events.  Provocations:  Hyperventilation and Photic stimulation: was not performed.    Daily Summary:      No epileptiform activity and no significant clinical events occurred.      Laly Deras MD  Attending Neurologist, [facility]ilepsy [facility]

## 2023-04-26 NOTE — PROGRESS NOTE ADULT - ASSESSMENT
47y Male with PMHx of HTN, HLD, DM, CVA in 2022 with residual left sided deficits(not on aspirin), prior seizure in 2022 (not on any meds) presented to [facility] ED from ambulatory office after a sudden onset headache and syncopal episode with confusion. History in ED was obtained from chart review as the patient is not a good historian and emergency contact was unable to be reached. Stroke code called on arrival to ED. NIHSS 4. CTH w/ chronic infarcts left occipital, right basal ganglia and thalamic region. CTA w/ severe stenosis of the left P2, moderate to severe focal narrowing of the left superior M2, short segmental mild to moderate narrowing of the right inferior M2. 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 CTP w/ elevated Tmax in the right frontal lobe that may be artifact. Patient out of the window for Tenecteplase and not a candidate for mechanical thrombectomy. Pt started on aspirin and atorvostatin and admitted to stroke service for further workup with MRI with contrast, TTE and EEG.    Neuro  #CVA vs. Hypertensive urgency   - s/p 325mg aspirin in the field  - continue aspirin 81mg   - continue atorvastatin 80mg daily  - q4hr stroke neuro checks and vitals  - obtain MRI Brain WITH contrast for further characterization of 5.4 cm right suboccipital lipomatous lesion with multiple septae; findings may represent alipoma versus liposarcoma.  - Stroke Code HCT Results: chronic infarcts left occipital, right basal ganglia and thalamic region  - Stroke Code CTA Results: severe stenosis of the left P2, moderate to severe focal narrowing of the left superior M2, short segmental mild to moderate narrowing of the right inferior M2  - Stroke education  - EEG to r/o seizures- per outside documentation patient has history of seizures, not on any meds  - currently still on EEG    Cards  #HTN  - Goal -180  - Patient hypertensive in ED, hydralazine PRN to maintain SBP < 180  - pt unaware of what he takes for medication besides metformin, may take HTN meds  - obtain TTE with bubble  - Stroke Code EKG Results: NSR    #HLD  - high dose statin as above in CVA  - LDL results: 89    Pulm  - call provider if SPO2 < 94%    GI  #Nutrition/Fluids/Electrolytes   - replete K<4 and Mg <2  - Diet: Consistent Carb/DASH  - IVF: none for now    Renal  - LIZ    Infectious Disease  - Stroke Code CXR results: No acute pathology    Endocrine  #DM  - A1C results: 7.0  - Random BS: 254 on 4/26  - Moderate ISS    - TSH results: 2.240    DVT Prophylaxis  - lovenox sq for DVT prophylaxis   - SCDs for DVT prophylaxis       IDR Goals: Goals reviewed at interdisciplinary rounds with case management, social work, physical therapy, occupational therapy, and speech language pathology.   Please see specific therapy  notes for in depth goals.    Dispo: Home; No skilled PT needs     Discussed daily hospital plans and goals with patient at bedside.    Discussed with Neurology Attending Dr. Ene Hernandez   47y Male with PMHx of HTN, HLD, DM, CVA in 2022 with residual left sided deficits(not on aspirin), prior seizure in 2022 (not on any meds) presented to [facility] ED from ambulatory office after a sudden onset headache and syncopal episode with confusion. History in ED was obtained from chart review as the patient is not a good historian and emergency contact was unable to be reached. Stroke code called on arrival to ED. NIHSS 4. CTH w/ chronic infarcts left occipital, right basal ganglia and thalamic region. CTA w/ severe stenosis of the left P2, moderate to severe focal narrowing of the left superior M2, short segmental mild to moderate narrowing of the right inferior M2. 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 CTP w/ elevated Tmax in the right frontal lobe that may be artifact. Patient out of the window for Tenecteplase and not a candidate for mechanical thrombectomy. Pt started on aspirin and atorvostatin and admitted to stroke service for further workup with MRI with contrast, TTE and EEG.    Neuro  #CVA vs. Hypertensive urgency   - s/p 325mg aspirin in the field  - continue aspirin 81mg   - continue atorvastatin 80mg daily  - q4hr stroke neuro checks and vitals  - obtain MRI Brain WITH contrast for further characterization of 5.4 cm right suboccipital lipomatous lesion with multiple septae; findings may represent alipoma versus liposarcoma.  - Stroke Code HCT Results: chronic infarcts left occipital, right basal ganglia and thalamic region  - Stroke Code CTA Results: severe stenosis of the left P2, moderate to severe focal narrowing of the left superior M2, short segmental mild to moderate narrowing of the right inferior M2  - Stroke education  - EEG to r/o seizures- per outside documentation patient has history of seizures, not on any meds  - currently still on EEG    Cards  #HTN  - Goal -180  - Patient hypertensive in ED, hydralazine PRN to maintain SBP < 180  - pt not on any home BP medications  - obtain TTE with bubble  - Stroke Code EKG Results: NSR    #HLD  - high dose statin as above in CVA  - LDL results: 89  - Home medication: rosuvastatin 10mg      Pulm  - call provider if SPO2 < 94%    GI  #Nutrition/Fluids/Electrolytes   - replete K<4 and Mg <2  - Diet: Consistent Carb/DASH  - IVF: none for now    Renal  - LIZ    Infectious Disease  - Stroke Code CXR results: No acute pathology    Endocrine  #DM  - A1C results: 7.0  - Random BS: 254 on 4/26  - Moderate ISS  - home medication: metformin 500mg once daily     - TSH results: 2.240    DVT Prophylaxis  - lovenox sq for DVT prophylaxis   - SCDs for DVT prophylaxis       IDR Goals: Goals reviewed at interdisciplinary rounds with case management, social work, physical therapy, occupational therapy, and speech language pathology.   Please see specific therapy  notes for in depth goals.    Dispo: Home; No skilled PT needs     Discussed daily hospital plans and goals with patient at bedside.    Discussed with Neurology Attending Dr. Ene Hernandez   47y Male with PMHx of HTN, HLD, DM, CVA in 2022 with residual left sided deficits(not on aspirin), prior seizure in 2022 (not on any meds) presented to [facility] ED from ambulatory office after a sudden onset headache and syncopal episode with confusion. History in ED was obtained from chart review as the patient is not a good historian and emergency contact was unable to be reached. Stroke code called on arrival to ED. NIHSS 4. CTH w/ chronic infarcts left occipital, right basal ganglia and thalamic region. CTA w/ severe stenosis of the left P2, moderate to severe focal narrowing of the left superior M2, short segmental mild to moderate narrowing of the right inferior M2. 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 CTP w/ elevated Tmax in the right frontal lobe that may be artifact. Patient out of the window for Tenecteplase and not a candidate for mechanical thrombectomy. Pt started on aspirin and atorvastatin and admitted to stroke service for further workup with MRI with contrast, TTE and EEG.    Neuro  #CVA vs. Hypertensive urgency   - s/p 325mg aspirin in the field  - continue aspirin 81mg   - continue atorvastatin 80mg daily  - q4hr stroke neuro checks and vitals  - obtain MRI Brain WITH contrast for further characterization of 5.4 cm right suboccipital lipomatous lesion with multiple septae; findings may represent alipoma versus liposarcoma.  - Stroke Code HCT Results: chronic infarcts left occipital, right basal ganglia and thalamic region  - Stroke Code CTA Results: severe stenosis of the left P2, moderate to severe focal narrowing of the left superior M2, short segmental mild to moderate narrowing of the right inferior M2  - Stroke education  - EEG to r/o seizures- per outside documentation patient has history of seizures, not on any meds  - currently still on EEG  - hypercoag labs ordered    Cards  #HTN  - Goal -180  - Patient hypertensive in ED, hydralazine PRN to maintain SBP < 180  - pt not on any home BP medications  - obtain TTE with bubble  - Stroke Code EKG Results: NSR    #HLD  - high dose statin as above in CVA  - LDL results: 89  - Home medication: rosuvastatin 10mg      Pulm  - call provider if SPO2 < 94%    GI  #Nutrition/Fluids/Electrolytes   - replete K<4 and Mg <2  - Diet: Consistent Carb/DASH  - IVF: none for now    Renal  - LIZ    Infectious Disease  - Stroke Code CXR results: No acute pathology    Endocrine  #DM  - A1C results: 7.0  - Random BS: 254 on 4/26  - Moderate ISS  - home medication: metformin 500mg once daily     - TSH results: 2.240    DVT Prophylaxis  - lovenox sq for DVT prophylaxis   - SCDs for DVT prophylaxis       IDR Goals: Goals reviewed at interdisciplinary rounds with case management, social work, physical therapy, occupational therapy, and speech language pathology.   Please see specific therapy  notes for in depth goals.    Dispo: Home; No skilled PT needs     Discussed daily hospital plans and goals with patient at bedside.    Discussed with Neurology Attending Dr. Ene Hernandez

## 2023-04-26 NOTE — OCCUPATIONAL THERAPY INITIAL EVALUATION ADULT - ADDITIONAL COMMENTS
Pt lives with his daughter (works at night) in an apartment, no ILDEFONSO, has elevator access. Prior to admit, pt reports being independent with ADLs/IADLs and mobility, did not require any AD or DME. Pt is R hand dominant and wears glasses Pt lives with his daughter (works at night) in an apartment, no ILDEFONSO, has elevator access. Prior to admit, pt reports being independent with ADLs/IADLs and mobility, did not require any AD or DME. Pt is R hand dominant and wears glasses for reading. Pt reports history of 1-2 falls recently but did not state why.

## 2023-04-26 NOTE — CONSULT NOTE ADULT - SUBJECTIVE AND OBJECTIVE BOX
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 History was obtained from chart review as the patient is not a good historian and emergency contact was unable to be reached. Reportedly his ex-wife picked him up around 10am this morning for an appointment and the patient was complaining of a headache. At the office the patient then had a syncopal episode and when he woke up he was confused. Unknown how long he was unresponsive. When EMS arrive the patient was hypertensive to the 200's (not on any BP meds at home). Stroke code was called on arrival to the ED. NIHSS 4. CTH w/ chronic infarcts left occipital, right basal ganglia and thalamic region. CTA w/ severe stenosis of the left P2, moderate to severe focal narrowing of the left superior M2, short segmental mild to moderate narrowing of the right inferior M2. CTP w/ elevated Tmax in the right frontal lobe that may be artifact. Patient out of the window for Tenecteplase and not a candidate for mechanical thrombectomy.   On reassessment patient was much more alert and oriented, able to answer more questions appropriately with mild confusion. Remembers that he had his prior stroke at [facility] but does not remember being told to take aspirin and only takes it intermittently when he has headaches. "      47M w CVA in 2022 w residual L sided deficits, HTN, HLD, DM2, p/w syncopal episode and confusion in outpatient office, SBP 200s, NIHSS 4, found to have chronic L occipital, R BG and Thalamic infarction, CTA w severe L P2 stenosis, mod-severe narrowing of L Superior M2, admitted for CVA/TIA work-up    ROS  [facility]    PAST MEDICAL & SURGICAL HISTORY:  DM (diabetes mellitus)          FAMILY HISTORY:              T(C): 36.9 (04-25-23 @ 15:45), Max: 36.9 (04-25-23 @ 15:45)  HR: 65 (04-25-23 @ 15:53) (65 - 69)  BP: 203/103 (04-25-23 @ 15:53) (203/103 - 227/104)  RR: 16 (04-25-23 @ 15:53) (16 - 18)  SpO2: 98% (04-25-23 @ 15:53) (96% - 98%)    MEDICATION RECONCILIATION   MEDICATIONS  (STANDING):  atorvastatin 80 milliGRAM(s) Oral at bedtime    MEDICATIONS  (PRN):    Allergies    No Known Allergies    Intolerances            RADIOLOGY & ADDITIONAL STUDIES:    HCT:   < from: CT Brain Stroke Protocol (04.25.23 @ 14:32) >  Impression: No acute intracranial injury. Left occipital chronic   infarction. Right basal ganglia and thalamic chronic lacunar infarcts.      < from: CT Angio Brain Stroke Protocol  w/ IV Cont (04.25.23 @ 14:39) >  Impression:  Short segmental mild to moderate narrowing of the right inferior division   of the M2 segment of the middle cerebral artery.    Moderate to severe focal narrowing of the left superior division of the   M2 segment of the middle cerebral artery    Severe stenosis of the left P2 segment of the PCA.    Normal CTA of the extracranial circulation. No evidence of carotid   stenosis.    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 Recommend MRI   with contrast for further characterization.    < from: CT Brain Perfusion Maps Stroke (04.25.23 @ 14:39) >  IMPRESSION: Small volume reported elevated Tmax in the right frontal lobe   which may be artifactual. Otherwise no reported abnormal perfusion   metrics.         (25 Apr 2023 17:54)      PAST MEDICAL & SURGICAL HISTORY:  DM (diabetes mellitus)        Home Meds: Home Medications:  atorvastatin 40 mg oral tablet: 1 orally once a day (at bedtime) (25 Apr 2023 20:38)  MetFORMIN (Eqv-Glucophage XR) 500 mg oral tablet, extended release: 1 orally once a day (25 Apr 2023 20:38)    Allergies: Allergies    No Known Allergies    Intolerances      Soc:   Advanced Directives: Presumed Full Code     CURRENT MEDICATIONS:   --------------------------------------------------------------------------------------  Neurologic Medications    Respiratory Medications    Cardiovascular Medications    Gastrointestinal Medications  dextrose 5%. 1000 milliLiter(s) IV Continuous <Continuous>  dextrose 5%. 1000 milliLiter(s) IV Continuous <Continuous>  magnesium sulfate  IVPB 2 Gram(s) IV Intermittent once    Genitourinary Medications    Hematologic/Oncologic Medications  aspirin enteric coated 81 milliGRAM(s) Oral daily  enoxaparin Injectable 40 milliGRAM(s) SubCutaneous every 24 hours    Antimicrobial/Immunologic Medications    Endocrine/Metabolic Medications  atorvastatin 80 milliGRAM(s) Oral at bedtime  dextrose 50% Injectable 12.5 Gram(s) IV Push once  dextrose 50% Injectable 25 Gram(s) IV Push once  dextrose 50% Injectable 25 Gram(s) IV Push once  dextrose Oral Gel 15 Gram(s) Oral once PRN Blood Glucose LESS THAN 70 milliGRAM(s)/deciliter  glucagon  Injectable 1 milliGRAM(s) IntraMuscular once  insulin lispro (ADMELOG) corrective regimen sliding scale   SubCutaneous Before meals and at bedtime    Topical/Other Medications    --------------------------------------------------------------------------------------    VITAL SIGNS, INS/OUTS (last 24 hours):  --------------------------------------------------------------------------------------  ICU Vital Signs Last 24 Hrs  T(C): 37.2 (26 Apr 2023 05:26), Max: 37.2 (26 Apr 2023 05:26)  T(F): 98.9 (26 Apr 2023 05:26), Max: 98.9 (26 Apr 2023 05:26)  HR: 69 (26 Apr 2023 09:15) (64 - 88)  BP: 124/73 (26 Apr 2023 09:15) (124/73 - 231/118)  BP(mean): 92 (26 Apr 2023 09:15) (92 - 116)  ABP: --  ABP(mean): --  RR: 17 (26 Apr 2023 09:15) (16 - 18)  SpO2: 99% (26 Apr 2023 09:15) (96% - 100%)    O2 Parameters below as of 26 Apr 2023 09:15  Patient On (Oxygen Delivery Method): room air          I&O's Summary    25 Apr 2023 07:01  -  26 Apr 2023 07:00  --------------------------------------------------------  IN: 0 mL / OUT: 125 mL / NET: -125 mL      --------------------------------------------------------------------------------------    EXAM:    LABS  --------------------------------------------------------------------------------------  Labs:  CAPILLARY BLOOD GLUCOSE  307 (25 Apr 2023 17:48)      POCT Blood Glucose.: 97 mg/dL (26 Apr 2023 06:41)  POCT Blood Glucose.: 178 mg/dL (25 Apr 2023 21:36)  POCT Blood Glucose.: 307 mg/dL (25 Apr 2023 14:25)                          11.0   6.94  )-----------( 247      ( 26 Apr 2023 07:09 )             36.2       Auto Neutrophil %: 91.9 % (04-25-23 @ 15:06)  Auto Immature Granulocyte %: 0.4 % (04-25-23 @ 15:06)    04-26    139  |  101  |  16  ----------------------------<  97  3.6   |  26  |  0.91      Calcium, Total Serum: 8.8 mg/dL (04-26-23 @ 07:09)      LFTs:             6.7  | 0.2  | 9        ------------------[56      ( 25 Apr 2023 15:06 )  3.6  | x    | <5          Lipase:x      Amylase:x             Coags:     13.2   ----< 1.11    ( 25 Apr 2023 15:06 )     25.2        CARDIAC MARKERS ( 25 Apr 2023 15:06 )  x     / 0.01 ng/mL / x     / x     / x            Drug Screen W/PCP, Urine: Done (04-25-23 @ 17:04)  Alcohol, Blood: <10 mg/dL (04-25-23 @ 15:06)          --------------------------------------------------------------------------------------    OTHER LABS    IMAGING RESULTS  ****************     HPI: "47y Male with PMHx of HTN, HLD, DM, CVA in 2022 with residual left sided deficits(not on aspirin), ?seizure history not on any meds was sent in from ambulatory office after a syncopal episode and confusion. History was obtained from chart review as the patient is not a good historian and emergency contact was unable to be reached. Reportedly his ex-wife picked him up around 10am this morning for an appointment and the patient was complaining of a headache. At the office the patient then had a syncopal episode and when he woke up he was confused. Unknown how long he was unresponsive. When EMS arrive the patient was hypertensive to the 200's (not on any BP meds at home). Stroke code was called on arrival to the ED. NIHSS 4. CTH w/ chronic infarcts left occipital, right basal ganglia and thalamic region. CTA w/ severe stenosis of the left P2, moderate to severe focal narrowing of the left superior M2, short segmental mild to moderate narrowing of the right inferior M2. CTP w/ elevated Tmax in the right frontal lobe that may be artifact. Patient out of the window for Tenecteplase and not a candidate for mechanical thrombectomy.   On reassessment patient was much more alert and oriented, able to answer more questions appropriately with mild confusion. Remembers that he had his prior stroke at [facility] but does not remember being told to take aspirin and only takes it intermittently when he has headaches. "      47M w CVA in 2022 w residual L sided deficits, HTN, HLD, DM2, p/w syncopal episode and confusion in outpatient office, SBP 200s, NIHSS 4, found to have chronic L occipital, R BG and Thalamic infarction, CTA w severe L P2 stenosis, mod-severe narrowing of L Superior M2, admitted for CVA/TIA work-up    Pt reports taking metformin and no other medications. Denies any changes to medications recently. Also denies any residual sxs from prior stroke in 2022 - states he was on blood thinner/anti-platelets but no longer taking them. Pt presented after feeling dizzy and in a fog while at PCP- Dr. Earnest Deleon's office yesterday. Denies losing consciousness chest pain, dyspnea, numbness. Otherwise feeling better today    ROS: 12 point ros reviewed and otherwise negative as per HPI  FH: No DVT/PE  SH Smokes 1-2 cigarettes daily. No EtOH    PAST MEDICAL & SURGICAL HISTORY:  DM (diabetes mellitus)          FAMILY HISTORY:              T(C): 36.9 (04-25-23 @ 15:45), Max: 36.9 (04-25-23 @ 15:45)  HR: 65 (04-25-23 @ 15:53) (65 - 69)  BP: 203/103 (04-25-23 @ 15:53) (203/103 - 227/104)  RR: 16 (04-25-23 @ 15:53) (16 - 18)  SpO2: 98% (04-25-23 @ 15:53) (96% - 98%)    MEDICATION RECONCILIATION   MEDICATIONS  (STANDING):  atorvastatin 80 milliGRAM(s) Oral at bedtime    MEDICATIONS  (PRN):    Allergies    No Known Allergies    Intolerances            RADIOLOGY & ADDITIONAL STUDIES:    HCT:   < from: CT Brain Stroke Protocol (04.25.23 @ 14:32) >  Impression: No acute intracranial injury. Left occipital chronic   infarction. Right basal ganglia and thalamic chronic lacunar infarcts.      < from: CT Angio Brain Stroke Protocol  w/ IV Cont (04.25.23 @ 14:39) >  Impression:  Short segmental mild to moderate narrowing of the right inferior division   of the M2 segment of the middle cerebral artery.    Moderate to severe focal narrowing of the left superior division of the   M2 segment of the middle cerebral artery    Severe stenosis of the left P2 segment of the PCA.    Normal CTA of the extracranial circulation. No evidence of carotid   stenosis.    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 Recommend MRI   with contrast for further characterization.    < from: CT Brain Perfusion Maps Stroke (04.25.23 @ 14:39) >  IMPRESSION: Small volume reported elevated Tmax in the right frontal lobe   which may be artifactual. Otherwise no reported abnormal perfusion   metrics.         (25 Apr 2023 17:54)      PAST MEDICAL & SURGICAL HISTORY:  DM (diabetes mellitus)        Home Meds: Home Medications:  atorvastatin 40 mg oral tablet: 1 orally once a day (at bedtime) (25 Apr 2023 20:38)  MetFORMIN (Eqv-Glucophage XR) 500 mg oral tablet, extended release: 1 orally once a day (25 Apr 2023 20:38)    Allergies: Allergies    No Known Allergies    Intolerances      Soc:   Advanced Directives: Presumed Full Code     CURRENT MEDICATIONS:   --------------------------------------------------------------------------------------  Neurologic Medications    Respiratory Medications    Cardiovascular Medications    Gastrointestinal Medications  dextrose 5%. 1000 milliLiter(s) IV Continuous <Continuous>  dextrose 5%. 1000 milliLiter(s) IV Continuous <Continuous>  magnesium sulfate  IVPB 2 Gram(s) IV Intermittent once    Genitourinary Medications    Hematologic/Oncologic Medications  aspirin enteric coated 81 milliGRAM(s) Oral daily  enoxaparin Injectable 40 milliGRAM(s) SubCutaneous every 24 hours    Antimicrobial/Immunologic Medications    Endocrine/Metabolic Medications  atorvastatin 80 milliGRAM(s) Oral at bedtime  dextrose 50% Injectable 12.5 Gram(s) IV Push once  dextrose 50% Injectable 25 Gram(s) IV Push once  dextrose 50% Injectable 25 Gram(s) IV Push once  dextrose Oral Gel 15 Gram(s) Oral once PRN Blood Glucose LESS THAN 70 milliGRAM(s)/deciliter  glucagon  Injectable 1 milliGRAM(s) IntraMuscular once  insulin lispro (ADMELOG) corrective regimen sliding scale   SubCutaneous Before meals and at bedtime    Topical/Other Medications    --------------------------------------------------------------------------------------    VITAL SIGNS, INS/OUTS (last 24 hours):  --------------------------------------------------------------------------------------  ICU Vital Signs Last 24 Hrs  T(C): 37.2 (26 Apr 2023 05:26), Max: 37.2 (26 Apr 2023 05:26)  T(F): 98.9 (26 Apr 2023 05:26), Max: 98.9 (26 Apr 2023 05:26)  HR: 69 (26 Apr 2023 09:15) (64 - 88)  BP: 124/73 (26 Apr 2023 09:15) (124/73 - 231/118)  BP(mean): 92 (26 Apr 2023 09:15) (92 - 116)  ABP: --  ABP(mean): --  RR: 17 (26 Apr 2023 09:15) (16 - 18)  SpO2: 99% (26 Apr 2023 09:15) (96% - 100%)    O2 Parameters below as of 26 Apr 2023 09:15  Patient On (Oxygen Delivery Method): room air          I&O's Summary    25 Apr 2023 07:01  -  26 Apr 2023 07:00  --------------------------------------------------------  IN: 0 mL / OUT: 125 mL / NET: -125 mL      --------------------------------------------------------------------------------------    EXAM:  GEN: Male in NAD on RA  HEENT: NC/AT, MMM  CV: RRR, nml S1S2, no murmurs  PULM: nml effort, CTAB  ABD: Soft, non-distended, NABS, non-tender  NEURO  A/O x3,   EOMI - no droop  5/5 in BUE and BLE  nml finger to nose - no dysmetria.   sensation intact  PSYCH: Appropriate    LABS  --------------------------------------------------------------------------------------  Labs:  CAPILLARY BLOOD GLUCOSE  307 (25 Apr 2023 17:48)      POCT Blood Glucose.: 97 mg/dL (26 Apr 2023 06:41)  POCT Blood Glucose.: 178 mg/dL (25 Apr 2023 21:36)  POCT Blood Glucose.: 307 mg/dL (25 Apr 2023 14:25)                          11.0   6.94  )-----------( 247      ( 26 Apr 2023 07:09 )             36.2       Auto Neutrophil %: 91.9 % (04-25-23 @ 15:06)  Auto Immature Granulocyte %: 0.4 % (04-25-23 @ 15:06)    04-26    139  |  101  |  16  ----------------------------<  97  3.6   |  26  |  0.91      Calcium, Total Serum: 8.8 mg/dL (04-26-23 @ 07:09)      LFTs:             6.7  | 0.2  | 9        ------------------[56      ( 25 Apr 2023 15:06 )  3.6  | x    | <5          Lipase:x      Amylase:x             Coags:     13.2   ----< 1.11    ( 25 Apr 2023 15:06 )     25.2        CARDIAC MARKERS ( 25 Apr 2023 15:06 )  x     / 0.01 ng/mL / x     / x     / x            Drug Screen W/PCP, Urine: Done (04-25-23 @ 17:04)  Alcohol, Blood: <10 mg/dL (04-25-23 @ 15:06)          --------------------------------------------------------------------------------------    OTHER LABS    IMAGING RESULTS  ****************

## 2023-04-26 NOTE — CONSULT NOTE ADULT - SUBJECTIVE AND OBJECTIVE BOX
Patient is a 47y old  Male who presents with a chief complaint of     HPI:   **STROKE HPI***    HPI: 47y Male with PMHx of HTN, HLD, DM, CVA in 2022 with residual left sided deficits(not on aspirin), ?seizure history not on any meds was sent in from ambulatory office after a syncopal episode and confusion. History was obtained from chart review as the patient is not a good historian and emergency contact was unable to be reached. Reportedly his ex-wife picked him up around 10am this morning for an appointment and the patient was complaining of a headache. At the office the patient then had a syncopal episode and when he woke up he was confused. Unknown how long he was unresponsive. When EMS arrive the patient was hypertensive to the 200's (not on any BP meds at home). Stroke code was called on arrival to the ED. NIHSS 4. CTH w/ chronic infarcts left occipital, right basal ganglia and thalamic region. CTA w/ severe stenosis of the left P2, moderate to severe focal narrowing of the left superior M2, short segmental mild to moderate narrowing of the right inferior M2. CTP w/ elevated Tmax in the right frontal lobe that may be artifact. Patient out of the window for Tenecteplase and not a candidate for mechanical thrombectomy.   On reassessment patient was much more alert and oriented, able to answer more questions appropriately with mild confusion. Remembers that he had his prior stroke at [facility] but does not remember being told to take aspirin and only takes it intermittently when he has headaches.     PAST MEDICAL & SURGICAL HISTORY:  DM (diabetes mellitus)          FAMILY HISTORY:          T(C): 36.9 (04-25-23 @ 15:45), Max: 36.9 (04-25-23 @ 15:45)  HR: 65 (04-25-23 @ 15:53) (65 - 69)  BP: 203/103 (04-25-23 @ 15:53) (203/103 - 227/104)  RR: 16 (04-25-23 @ 15:53) (16 - 18)  SpO2: 98% (04-25-23 @ 15:53) (96% - 98%)    MEDICATION RECONCILIATION   MEDICATIONS  (STANDING):  atorvastatin 80 milliGRAM(s) Oral at bedtime    MEDICATIONS  (PRN):    Allergies    No Known Allergies    Intolerances            RADIOLOGY & ADDITIONAL STUDIES:    HCT:   < from: CT Brain Stroke Protocol (04.25.23 @ 14:32) >  Impression: No acute intracranial injury. Left occipital chronic   infarction. Right basal ganglia and thalamic chronic lacunar infarcts.      < from: CT Angio Brain Stroke Protocol  w/ IV Cont (04.25.23 @ 14:39) >  Impression:  Short segmental mild to moderate narrowing of the right inferior division   of the M2 segment of the middle cerebral artery.    Moderate to severe focal narrowing of the left superior division of the   M2 segment of the middle cerebral artery    Severe stenosis of the left P2 segment of the PCA.    Normal CTA of the extracranial circulation. No evidence of carotid   stenosis.    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 Recommend MRI   with contrast for further characterization.    < from: CT Brain Perfusion Maps Stroke (04.25.23 @ 14:39) >  IMPRESSION: Small volume reported elevated Tmax in the right frontal lobe   which may be artifactual. Otherwise no reported abnormal perfusion   metrics.         (25 Apr 2023 17:54)    PAST MEDICAL & SURGICAL HISTORY:  DM (diabetes mellitus)        MEDICATIONS  (STANDING):  aspirin enteric coated 81 milliGRAM(s) Oral daily  atorvastatin 80 milliGRAM(s) Oral at bedtime  dextrose 5%. 1000 milliLiter(s) (50 mL/Hr) IV Continuous <Continuous>  dextrose 5%. 1000 milliLiter(s) (100 mL/Hr) IV Continuous <Continuous>  dextrose 50% Injectable 25 Gram(s) IV Push once  dextrose 50% Injectable 25 Gram(s) IV Push once  dextrose 50% Injectable 12.5 Gram(s) IV Push once  enoxaparin Injectable 40 milliGRAM(s) SubCutaneous every 24 hours  glucagon  Injectable 1 milliGRAM(s) IntraMuscular once  insulin lispro (ADMELOG) corrective regimen sliding scale   SubCutaneous Before meals and at bedtime    MEDICATIONS  (PRN):  dextrose Oral Gel 15 Gram(s) Oral once PRN Blood Glucose LESS THAN 70 milliGRAM(s)/deciliter         FAMILY HISTORY:      CBC Full  -  ( 26 Apr 2023 07:09 )  WBC Count : 6.94 K/uL  RBC Count : 4.78 M/uL  Hemoglobin : 11.0 g/dL  Hematocrit : 36.2 %  Platelet Count - Automated : 247 K/uL  Mean Cell Volume : 75.7 fl  Mean Cell Hemoglobin : 23.0 pg  Mean Cell Hemoglobin Concentration : 30.4 gm/dL  Auto Neutrophil # : x  Auto Lymphocyte # : x  Auto Monocyte # : x  Auto Eosinophil # : x  Auto Basophil # : x  Auto Neutrophil % : x  Auto Lymphocyte % : x  Auto Monocyte % : x  Auto Eosinophil % : x  Auto Basophil % : x      04-26    139  |  101  |  16  ----------------------------<  97  3.6   |  26  |  0.91    Ca    8.8      26 Apr 2023 07:09  Phos  4.0     04-26  Mg     1.8     04-26    TPro  6.7  /  Alb  3.6  /  TBili  0.2  /  DBili  x   /  AST  9<L>  /  ALT  <5<L>  /  AlkPhos  56  04-25           REVIEW OF SYSTEMS:      CONSTITUTIONAL: No fever, weight loss, or fatigue  EYES: No eye pain, visual disturbances, or discharge  ENMT:  No difficulty hearing, tinnitus, vertigo; No sinus or throat pain  NECK: No pain or stiffness  BREASTS: No pain, masses, or nipple discharge  RESPIRATORY: No cough, wheezing, chills or hemoptysis; No shortness of breath  CARDIOVASCULAR: No chest pain, palpitations, dizziness, or leg swelling  GASTROINTESTINAL: No abdominal or epigastric pain. No nausea, vomiting, or hematemesis; No diarrhea or constipation. No melena or hematochezia.  GENITOURINARY: No dysuria, frequency, hematuria, or incontinence  NEUROLOGICAL:  per hpi   SKIN: No itching, burning, rashes, or lesions   LYMPH NODES: No enlarged glands  ENDOCRINE: No heat or cold intolerance; No hair loss  MUSCULOSKELETAL: No joint pain or swelling; No muscle, back, or extremity pain  PSYCHIATRIC: No depression, anxiety, mood swings, or difficulty sleeping  HEME/LYMPH: No easy bruising, or bleeding gums  ALLERGY AND IMMUNOLOGIC: No hives or eczema  VASCULAR: no swelling , erythema          Vital Signs Last 24 Hrs  T(C): 36.7 (26 Apr 2023 09:31), Max: 37.2 (26 Apr 2023 05:26)  T(F): 98.1 (26 Apr 2023 09:31), Max: 98.9 (26 Apr 2023 05:26)  HR: 73 (26 Apr 2023 11:54) (64 - 88)  BP: 146/81 (26 Apr 2023 11:54) (124/73 - 231/118)  BP(mean): 108 (26 Apr 2023 11:54) (92 - 116)  RR: 18 (26 Apr 2023 11:54) (16 - 18)  SpO2: 100% (26 Apr 2023 11:54) (96% - 100%)    Parameters below as of 26 Apr 2023 11:54  Patient On (Oxygen Delivery Method): room air            Physical Exam :   47 y o man lying comfortably in semi Pedroza's position , awake , alert , no acute complaints     Head : normocephalic , atraumatic    Eyes : PERRLA , EOMI , no nystagmus , sclera anicteric    ENT : neg nasal discharge , uvula midline , no oropharyngeal erythema / exudate    Face: no ecchymosis , hematoma     Neck : supple , negative JVD , negative carotid bruits , no thyromegaly    Chest : CTA bilaterally , neg wheeze / rhonchi / rales / crackles / egophany    Cardiovascular : regular rate and rhythm , neg murmurs / rubs / gallops    Abdomen : soft , non distended , non tender to palpation in all 4 quadrants ,  normal bowel sounds     Extremities : WWP , neg cyanosis /clubbing / edema     Neurologic Exam :    Alert and oriented to person , place , date/year , speech fluent w/o dysarthria     Cranial Nerves:     II :                         pupils equal , round and reactive to light , visual fields intact    III/ IV/VI :              extraocular movements intact , neg nystagmus , neg ptosis  V :                        facial sensation intact , V1-3 normal  VII :                      face symmetric , no droop , normal eye closure and smile  VIII :                     hearing intact to finger rub bilaterally  IX and X :             no hoarseness , gag intact , palate/ uvula rise symmetrically  XI :                       SCM / trapezius strength intact bilateral  XII :                      no tongue deviation    Motor Exam:          Right UE:               no focal weakness ,  > 4/5 throughout  , no drift                             Left UE:                 no focal weakness ,  > 4/5 throughout  , no drift       Right LE:                no focal weakness ,  > 4/5 throughout      Left LE:                  no focal weakness ,  > 4/5 throughout         Sensation :         intact to light touch x 4 extremities                            no neglect or extinction on double simultaneous testing      DTR :                     biceps/brachioradialis : equal                              patella/ankle : equal                                                              neg Babinski       Coordination :      Finger to Nose :  slight L hypometria     Gait :  not tested      PM&R Impression :     1) admitted for sudden onset headache and syncopal episode with confusion.    2) no focal weakness         Recommendations / Plan :       1) Physical / Occupational therapy focusing on therapeutic exercises , equipment evaluation , bed mobility/transfer out of bed evaluation , progressive ambulation with assistive devices prn .    2) Current disposition plan recommendation  :    d/c home with no rehab needs

## 2023-04-26 NOTE — CONSULT NOTE ADULT - ASSESSMENT
Neurology    47 y o Male with PMHx of HTN, HLD, DM, CVA in 2022 with residual left sided deficits(not on aspirin), ?seizure history not on any meds was sent in from ambulatory office after a sudden onset headache and syncopal episode with confusion. History was obtained from chart review as the patient is not a good historian and emergency contact was unable to be reached. Stroke code called on arrival to ED. NIHSS 4. CTH w/ chronic infarcts left occipital, right basal ganglia and thalamic region. CTA w/ severe stenosis of the left P2, moderate to severe focal narrowing of the left superior M2, short segmental mild to moderate narrowing of the right inferior M2. CTP w/ elevated Tmax in the right frontal lobe that may be artifact. Patient out of the window for Tenecteplase and not a candidate for mechanical thrombectomy. Unknown was patient's prior stroke workup was and why he is not on any antiplatelet therapy. Admitted to stroke service for further workup with MRI and EEG.    Neuro  #CVA workup  - s/p 325mg aspirin in the field  - continue aspirin 81mg   - continue atorvastatin 80mg daily  - q4hr stroke neuro checks and vitals  - obtain MRI Brain without contrast  - Stroke Code HCT Results: chronic infarcts left occipital, right basal ganglia and thalamic region  - Stroke Code CTA Results: severe stenosis of the left P2, moderate to severe focal narrowing of the left superior M2, short segmental mild to moderate narrowing of the right inferior M2  - Stroke education  - EEG to r/o seizures- per outside documentation patient has history of seizures, not on any meds    Cards  #HTN  - permissive hypertension, Goal -180  - hold home blood pressure medication for now  - Patient hypertensive in ED, hydralazine PRN to maintain SBP < 180  - obtain TTE with bubble  - Stroke Code EKG Results: NSR    #HLD  - high dose statin as above in CVA  - LDL results: pending    Pulm  - call provider if SPO2 < 94%    GI  #Nutrition/Fluids/Electrolytes   - replete K<4 and Mg <2  - Diet: Consistent Carb/DASH  - IVF: none for now    Renal  - LIZ    Infectious Disease  - Stroke Code CXR results: No acute pathology    Endocrine  #DM  - A1C results: pending  - ISS    - TSH results: pending    DVT Prophylaxis  - lovenox sq for DVT prophylaxis   - SCDs for DVT prophylaxis       IDR Goals: Goals reviewed at interdisciplinary rounds with case management, social work, physical therapy, occupational therapy, and speech language pathology.   Please see specific therapy  notes for in depth goals.  Dispo: Pending PM&R / PT/OT evals

## 2023-04-26 NOTE — OCCUPATIONAL THERAPY INITIAL EVALUATION ADULT - GENERAL OBSERVATIONS, REHAB EVAL
MRS 4. Pt received semi-supine in bed, +tele, +IV, in NAD and agreeable to OT. Cleared by DENISE Vu to see.

## 2023-04-26 NOTE — OCCUPATIONAL THERAPY INITIAL EVALUATION ADULT - MODALITIES TREATMENT COMMENTS
Cranial Nerves II - XII: II: PERRLA; visual fields are full to confrontation III, IV, VI: EOMI, no nystagmus appreciated V: facial sensation intact to light touch V1-V3 b/l VII: no ptosis, mild L facial droop with smile, symmetric eyebrow raise and closure VIII: hearing intact to finger rub b/l  XI: head turning and shoulder shrug intact b/l XII: tongue protrusion midline. Vision H-test: smooth bilateral pursuits. Vision Quadrant Test: impairment in L lower temporal quadrant noted.

## 2023-04-26 NOTE — PHYSICAL THERAPY INITIAL EVALUATION ADULT - MODALITIES TREATMENT COMMENTS
Facial nerve: slightly drooped L smile; Visual quadrant impaired L lower temporal; Visual tracking all directions intact

## 2023-04-26 NOTE — OCCUPATIONAL THERAPY INITIAL EVALUATION ADULT - MANUAL MUSCLE TESTING RESULTS, REHAB EVAL
BUE/BLE ~5/5 at all major pivot points/no strength deficits were identified RUE/RLE ~5/5 at all major pivot points. LUE ~5/5 at all major pivot points with exception of shoulder flexion ~4+/5. LLE ~5/5 at all major pivot points with exception of hip flexion ~4/5

## 2023-04-26 NOTE — OCCUPATIONAL THERAPY INITIAL EVALUATION ADULT - DIAGNOSIS, OT EVAL
Pt admitted for stroke workup 2/2 syncopal episode presents close to functional baseline with mild balance deficits, decreased activity tolerance, and mild confusion impacting ability to perform ADLs and functional mobility tasks at [facility].

## 2023-04-26 NOTE — OCCUPATIONAL THERAPY INITIAL EVALUATION ADULT - MODIFIED CLINICAL TEST OF SENSORY INTEGRATION IN BALANCE TEST
Pt able to ambulate ~100' with CGA using no AD, slight veering towards L side with decreased safety awareness but no LOB noted.

## 2023-04-26 NOTE — PROGRESS NOTE ADULT - SUBJECTIVE AND OBJECTIVE BOX
Neurology Stroke Progress Note    INTERVAL HPI/OVERNIGHT EVENTS:  No acute events overnight. Patient seen and examined at bedside     MEDICATIONS  (STANDING):  aspirin enteric coated 81 milliGRAM(s) Oral daily  atorvastatin 80 milliGRAM(s) Oral at bedtime  dextrose 5%. 1000 milliLiter(s) (50 mL/Hr) IV Continuous <Continuous>  dextrose 5%. 1000 milliLiter(s) (100 mL/Hr) IV Continuous <Continuous>  dextrose 50% Injectable 12.5 Gram(s) IV Push once  dextrose 50% Injectable 25 Gram(s) IV Push once  dextrose 50% Injectable 25 Gram(s) IV Push once  enoxaparin Injectable 40 milliGRAM(s) SubCutaneous every 24 hours  glucagon  Injectable 1 milliGRAM(s) IntraMuscular once  insulin lispro (ADMELOG) corrective regimen sliding scale   SubCutaneous Before meals and at bedtime    MEDICATIONS  (PRN):  dextrose Oral Gel 15 Gram(s) Oral once PRN Blood Glucose LESS THAN 70 milliGRAM(s)/deciliter      Allergies    No Known Allergies    Intolerances        Vital Signs Last 24 Hrs  T(C): 36.7 (26 Apr 2023 13:46), Max: 37.2 (26 Apr 2023 05:26)  T(F): 98 (26 Apr 2023 13:46), Max: 98.9 (26 Apr 2023 05:26)  HR: 73 (26 Apr 2023 11:54) (64 - 88)  BP: 146/81 (26 Apr 2023 11:54) (124/73 - 231/118)  BP(mean): 108 (26 Apr 2023 11:54) (92 - 116)  RR: 18 (26 Apr 2023 11:54) (16 - 18)  SpO2: 100% (26 Apr 2023 11:54) (96% - 100%)    Parameters below as of 26 Apr 2023 11:54  Patient On (Oxygen Delivery Method): room air        Physical exam:  General: No acute distress, awake and alert  Eyes: Anicteric sclerae, moist conjunctivae, see below for CNs  Neck: trachea midline, FROM, supple, no thyromegaly or lymphadenopathy  Cardiovascular: Regular rate and rhythm, no murmurs, rubs, or gallops. No carotid bruits.   Pulmonary: Anterior breath sounds clear bilaterally, no crackles or wheezing. No use of accessory muscles  GI: Abdomen soft, non-distended, non-tender  Extremities: Radial and DP pulses +2, no edema    Neurologic:  -Mental status: Awake, alert, oriented to person, place, and time. Speech is fluent with intact naming, repetition, and comprehension, no dysarthria. Recent and remote memory intact. Follows commands. Attention/concentration intact. Fund of knowledge appropriate.  -Cranial nerves:   II: Visual fields are full to confrontation.  III, IV, VI: Extraocular movements are intact without nystagmus. Pupils equally round and reactive to light  V:  Facial sensation V1-V3 equal and intact   VII: Face is symmetric with normal eye closure and smile  VIII: Hearing is bilaterally intact to finger rub  IX, X: Uvula is midline and soft palate rises symmetrically  XI: Head turning and shoulder shrug are intact.  XII: Tongue protrudes midline  Motor: Normal bulk and tone. No pronator drift. Strength bilateral upper extremity 5/5, bilateral lower extremities 5/5.  Rapid alternating movements intact and symmetric  Sensation: Intact to light touch bilaterally. No neglect or extinction on double simultaneous testing.  Coordination: No dysmetria on finger-to-nose and heel-to-shin bilaterally  Reflexes: Downgoing toes bilaterally   Gait: Narrow gait and steady    LABS:                        11.0   6.94  )-----------( 247      ( 26 Apr 2023 07:09 )             36.2     04-26    139  |  101  |  16  ----------------------------<  97  3.6   |  26  |  0.91    Ca    8.8      26 Apr 2023 07:09  Phos  4.0     04-26  Mg     1.8     04-26    TPro  6.7  /  Alb  3.6  /  TBili  0.2  /  DBili  x   /  AST  9<L>  /  ALT  <5<L>  /  AlkPhos  56  04-25    PT/INR - ( 25 Apr 2023 15:06 )   PT: 13.2 sec;   INR: 1.11          PTT - ( 25 Apr 2023 15:06 )  PTT:25.2 sec      RADIOLOGY & ADDITIONAL TESTS:     Neurology Stroke Progress Note    INTERVAL HPI/OVERNIGHT EVENTS:  No acute events overnight. Patient seen and examined at bedside. Pt in no acute distress and no complaints at this time. Pt denies any headache at this and endorses that the LUE weakness is residual from previous stroke. Pt visual field exam is inconsistent at this time, possible Upper L visual field deficit.     MEDICATIONS  (STANDING):  aspirin enteric coated 81 milliGRAM(s) Oral daily  atorvastatin 80 milliGRAM(s) Oral at bedtime  dextrose 5%. 1000 milliLiter(s) (50 mL/Hr) IV Continuous <Continuous>  dextrose 5%. 1000 milliLiter(s) (100 mL/Hr) IV Continuous <Continuous>  dextrose 50% Injectable 12.5 Gram(s) IV Push once  dextrose 50% Injectable 25 Gram(s) IV Push once  dextrose 50% Injectable 25 Gram(s) IV Push once  enoxaparin Injectable 40 milliGRAM(s) SubCutaneous every 24 hours  glucagon  Injectable 1 milliGRAM(s) IntraMuscular once  insulin lispro (ADMELOG) corrective regimen sliding scale   SubCutaneous Before meals and at bedtime    MEDICATIONS  (PRN):  dextrose Oral Gel 15 Gram(s) Oral once PRN Blood Glucose LESS THAN 70 milliGRAM(s)/deciliter      Allergies    No Known Allergies    Intolerances        Vital Signs Last 24 Hrs  T(C): 36.7 (26 Apr 2023 13:46), Max: 37.2 (26 Apr 2023 05:26)  T(F): 98 (26 Apr 2023 13:46), Max: 98.9 (26 Apr 2023 05:26)  HR: 73 (26 Apr 2023 11:54) (64 - 88)  BP: 146/81 (26 Apr 2023 11:54) (124/73 - 231/118)  BP(mean): 108 (26 Apr 2023 11:54) (92 - 116)  RR: 18 (26 Apr 2023 11:54) (16 - 18)  SpO2: 100% (26 Apr 2023 11:54) (96% - 100%)    Parameters below as of 26 Apr 2023 11:54  Patient On (Oxygen Delivery Method): room air        Physical exam:  General: No acute distress, awake and alert  Eyes: Anicteric sclerae, moist conjunctivae, see below for CNs  Neck: trachea midline, FROM, supple  Cardiovascular: Regular rate and rhythm  Pulmonary: Anterior breath sounds clear bilaterally, no crackles or wheezing. No use of accessory muscles  GI: Abdomen soft, non-distended, non-tender  Extremities: no edema    Neurologic:  -Mental status: Awake, alert, oriented to person, place, and time. Speech is slow but fluent with intact naming, repetition, and comprehension, no dysarthria. Recent and remote memory intact. Follows simple commands, can repeat complex commands upon repeated instruction. Attention/concentration intact.   -Cranial nerves:   II: R visual fields are full to confrontation; L visual field exam is inconsistent at this time, possible upper L visual field deficit.   III, IV, VI: Extraocular movements are intact without nystagmus. Pupils equally round and reactive to light  V:  Facial sensation V1-V3 equal and intact   VII: Face is symmetric with normal eye closure and smile  VIII: Hearing is bilaterally intact to voice  XI: Head turning and shoulder shrug are intact.  XII: Tongue protrudes midline  Motor: Normal bulk and tone. No pronator drift. RUE 5/5, LUE 4/5 with mild drift, (pt states this from previous stroke) Bilateral lower extremities 5/5.  Rapid alternating movements intact and symmetric  Sensation: Intact to light touch bilaterally. No neglect or extinction on double simultaneous testing.  Coordination: No dysmetria on R finger-to-nose; some dysmetria noted on with L hand possibly due secondary to LUE weakness  Gait: Deferred at this time    LABS:                        11.0   6.94  )-----------( 247      ( 26 Apr 2023 07:09 )             36.2     04-26    139  |  101  |  16  ----------------------------<  97  3.6   |  26  |  0.91    Ca    8.8      26 Apr 2023 07:09  Phos  4.0     04-26  Mg     1.8     04-26    TPro  6.7  /  Alb  3.6  /  TBili  0.2  /  DBili  x   /  AST  9<L>  /  ALT  <5<L>  /  AlkPhos  56  04-25    PT/INR - ( 25 Apr 2023 15:06 )   PT: 13.2 sec;   INR: 1.11          PTT - ( 25 Apr 2023 15:06 )  PTT:25.2 sec      RADIOLOGY & ADDITIONAL TESTS:    CT Brain Stroke (04.25.23 @ 14:32) >  Impression: No acute intracranial injury. Left occipital chronic infarction. Right basal ganglia and thalamic chronic lacunar infarcts.      CT Angio Brain IV Cont (04.25.23 @ 14:39) >  Impression:Short segmental mild to moderate narrowing of the right inferior division of the M2 segment of the middle cerebral artery.    Moderate to severe focal narrowing of the left superior division of the M2 segment of the middle cerebral artery    Severe stenosis of the left P2 segment of the PCA.Normal CTA of the extracranial circulation. No evidence of carotid stenosis.    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 Recommend MRI with contrast for further characterization.        CT Brain Perfusion Maps Stroke (04.25.23 @ 14:39)   IMPRESSION: Small volume reported elevated Tmax in the right frontal lobe which may be artifactual. Otherwise no reported abnormal perfusion metrics.   Neurology Stroke Progress Note    INTERVAL HPI/OVERNIGHT EVENTS:  No acute events overnight. Patient seen and examined at bedside. Pt in no acute distress and no complaints at this time. Pt denies any headache at this and endorses that the LUE weakness is residual from previous stroke. Pt visual field exam is inconsistent at this time, possible Upper L visual field deficit.     MEDICATIONS  (STANDING):  aspirin enteric coated 81 milliGRAM(s) Oral daily  atorvastatin 80 milliGRAM(s) Oral at bedtime  dextrose 5%. 1000 milliLiter(s) (50 mL/Hr) IV Continuous <Continuous>  dextrose 5%. 1000 milliLiter(s) (100 mL/Hr) IV Continuous <Continuous>  dextrose 50% Injectable 12.5 Gram(s) IV Push once  dextrose 50% Injectable 25 Gram(s) IV Push once  dextrose 50% Injectable 25 Gram(s) IV Push once  enoxaparin Injectable 40 milliGRAM(s) SubCutaneous every 24 hours  glucagon  Injectable 1 milliGRAM(s) IntraMuscular once  insulin lispro (ADMELOG) corrective regimen sliding scale   SubCutaneous Before meals and at bedtime    MEDICATIONS  (PRN):  dextrose Oral Gel 15 Gram(s) Oral once PRN Blood Glucose LESS THAN 70 milliGRAM(s)/deciliter      Allergies    No Known Allergies    Intolerances        Vital Signs Last 24 Hrs  T(C): 36.7 (26 Apr 2023 13:46), Max: 37.2 (26 Apr 2023 05:26)  T(F): 98 (26 Apr 2023 13:46), Max: 98.9 (26 Apr 2023 05:26)  HR: 73 (26 Apr 2023 11:54) (64 - 88)  BP: 146/81 (26 Apr 2023 11:54) (124/73 - 231/118)  BP(mean): 108 (26 Apr 2023 11:54) (92 - 116)  RR: 18 (26 Apr 2023 11:54) (16 - 18)  SpO2: 100% (26 Apr 2023 11:54) (96% - 100%)    Parameters below as of 26 Apr 2023 11:54  Patient On (Oxygen Delivery Method): room air        Physical exam:  General: No acute distress, awake and alert  Eyes: Anicteric sclerae, moist conjunctivae, see below for CNs  Neck: trachea midline, FROM, supple  Cardiovascular: Regular rate and rhythm  Pulmonary: Anterior breath sounds clear bilaterally, no crackles or wheezing. No use of accessory muscles  GI: Abdomen soft, non-distended, non-tender  Extremities: no edema    Neurologic:  -Mental status: Awake, alert, oriented to person, place, and time. Speech is slow but fluent with intact naming, repetition, and comprehension, no dysarthria. Recent and remote memory intact. Follows simple commands, can repeat complex commands upon repeated instruction. Attention/concentration intact.   -Cranial nerves:   II: R visual fields are full to confrontation; L visual field exam is inconsistent at this time, possible upper L visual field deficit.   III, IV, VI: Extraocular movements are intact without nystagmus. Pupils equally round and reactive to light  V:  Facial sensation V1-V3 equal and intact   VII: Face is symmetric with normal eye closure and smile  VIII: Hearing is bilaterally intact to voice  XI: Head turning and shoulder shrug are intact.  XII: Tongue protrudes midline  Motor: Normal bulk and tone. No pronator drift. RUE 5/5, LUE 4/5 with mild drift, (pt states this from previous stroke) Bilateral lower extremities 5/5.  Rapid alternating movements intact and symmetric  Sensation: Intact to light touch bilaterally. No neglect or extinction on double simultaneous testing.  Coordination: No dysmetria on R finger-to-nose; some dysmetria noted on with L hand possibly due secondary to LUE weakness  Gait: Deferred at this time    LABS:                        11.0   6.94  )-----------( 247      ( 26 Apr 2023 07:09 )             36.2     04-26    139  |  101  |  16  ----------------------------<  97  3.6   |  26  |  0.91    Ca    8.8      26 Apr 2023 07:09  Phos  4.0     04-26  Mg     1.8     04-26    TPro  6.7  /  Alb  3.6  /  TBili  0.2  /  DBili  x   /  AST  9<L>  /  ALT  <5<L>  /  AlkPhos  56  04-25    PT/INR - ( 25 Apr 2023 15:06 )   PT: 13.2 sec;   INR: 1.11          PTT - ( 25 Apr 2023 15:06 )  PTT:25.2 sec      RADIOLOGY & ADDITIONAL TESTS:    CT Brain Stroke (04.25.23 @ 14:32) >  Impression: No acute intracranial injury. Left occipital chronic infarction. Right basal ganglia and thalamic chronic lacunar infarcts.      CT Angio Brain IV Cont (04.25.23 @ 14:39) >  Impression:Short segmental mild to moderate narrowing of the right inferior division of the M2 segment of the middle cerebral artery.    Moderate to severe focal narrowing of the left superior division of the M2 segment of the middle cerebral artery  Severe stenosis of the left P2 segment of the PCA. Normal CTA of the extracranial circulation. No evidence of carotid stenosis.    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 Recommend MRI with contrast for further characterization.        CT Brain Perfusion Maps Stroke (04.25.23 @ 14:39)   IMPRESSION: Small volume reported elevated Tmax in the right frontal lobe which may be artifactual. Otherwise no reported abnormal perfusion metrics.

## 2023-04-26 NOTE — OCCUPATIONAL THERAPY INITIAL EVALUATION ADULT - PERTINENT HX OF CURRENT PROBLEM, REHAB EVAL
47M  CVA in 2022 with residual left sided deficits(not on aspirin), ?seizure history not on any meds was sent in from ambulatory office after a syncopal episode and confusion. History was obtained from chart review as the patient is not a good historian and emergency contact was unable to be reached. Reportedly his ex-wife picked him up around 10am this morning for an appointment and the patient was complaining of a headache.

## 2023-04-27 PROBLEM — Z00.00 ENCOUNTER FOR PREVENTIVE HEALTH EXAMINATION: Status: ACTIVE | Noted: 2023-04-27

## 2023-04-27 LAB
ALBUMIN SERPL ELPH-MCNC: 3.7 G/DL — SIGNIFICANT CHANGE UP (ref 3.3–5)
ALP SERPL-CCNC: 58 U/L — SIGNIFICANT CHANGE UP (ref 40–120)
ALT FLD-CCNC: 6 U/L — LOW (ref 10–45)
ANION GAP SERPL CALC-SCNC: 10 MMOL/L — SIGNIFICANT CHANGE UP (ref 5–17)
AST SERPL-CCNC: 12 U/L — SIGNIFICANT CHANGE UP (ref 10–40)
AT III ACT/NOR PPP CHRO: 70 % — LOW (ref 85–135)
B2 GLYCOPROT1 AB SER QL: NEGATIVE — SIGNIFICANT CHANGE UP
BILIRUB SERPL-MCNC: 0.2 MG/DL — SIGNIFICANT CHANGE UP (ref 0.2–1.2)
BUN SERPL-MCNC: 15 MG/DL — SIGNIFICANT CHANGE UP (ref 7–23)
CALCIUM SERPL-MCNC: 9.3 MG/DL — SIGNIFICANT CHANGE UP (ref 8.4–10.5)
CHLORIDE SERPL-SCNC: 102 MMOL/L — SIGNIFICANT CHANGE UP (ref 96–108)
CO2 SERPL-SCNC: 24 MMOL/L — SIGNIFICANT CHANGE UP (ref 22–31)
CREAT SERPL-MCNC: 0.84 MG/DL — SIGNIFICANT CHANGE UP (ref 0.5–1.3)
EGFR: 108 ML/MIN/1.73M2 — SIGNIFICANT CHANGE UP
GLUCOSE BLDC GLUCOMTR-MCNC: 107 MG/DL — HIGH (ref 70–99)
GLUCOSE BLDC GLUCOMTR-MCNC: 129 MG/DL — HIGH (ref 70–99)
GLUCOSE BLDC GLUCOMTR-MCNC: 160 MG/DL — HIGH (ref 70–99)
GLUCOSE BLDC GLUCOMTR-MCNC: 182 MG/DL — HIGH (ref 70–99)
GLUCOSE BLDC GLUCOMTR-MCNC: 189 MG/DL — HIGH (ref 70–99)
GLUCOSE SERPL-MCNC: 100 MG/DL — HIGH (ref 70–99)
HCT VFR BLD CALC: 37.5 % — LOW (ref 39–50)
HCYS SERPL-MCNC: 16.5 UMOL/L — HIGH
HGB BLD-MCNC: 10.8 G/DL — LOW (ref 13–17)
MAGNESIUM SERPL-MCNC: 2.2 MG/DL — SIGNIFICANT CHANGE UP (ref 1.6–2.6)
MCHC RBC-ENTMCNC: 23.2 PG — LOW (ref 27–34)
MCHC RBC-ENTMCNC: 28.8 GM/DL — LOW (ref 32–36)
MCV RBC AUTO: 80.5 FL — SIGNIFICANT CHANGE UP (ref 80–100)
NRBC # BLD: 0 /100 WBCS — SIGNIFICANT CHANGE UP (ref 0–0)
PHOSPHATE SERPL-MCNC: 3.6 MG/DL — SIGNIFICANT CHANGE UP (ref 2.5–4.5)
PLATELET # BLD AUTO: 181 K/UL — SIGNIFICANT CHANGE UP (ref 150–400)
POTASSIUM SERPL-MCNC: 4 MMOL/L — SIGNIFICANT CHANGE UP (ref 3.5–5.3)
POTASSIUM SERPL-SCNC: 4 MMOL/L — SIGNIFICANT CHANGE UP (ref 3.5–5.3)
PROT C ACT/NOR PPP: 94 % — SIGNIFICANT CHANGE UP (ref 74–150)
PROT SERPL-MCNC: 7.2 G/DL — SIGNIFICANT CHANGE UP (ref 6–8.3)
RBC # BLD: 4.66 M/UL — SIGNIFICANT CHANGE UP (ref 4.2–5.8)
RBC # FLD: 14.6 % — HIGH (ref 10.3–14.5)
SODIUM SERPL-SCNC: 136 MMOL/L — SIGNIFICANT CHANGE UP (ref 135–145)
WBC # BLD: 5.47 K/UL — SIGNIFICANT CHANGE UP (ref 3.8–10.5)
WBC # FLD AUTO: 5.47 K/UL — SIGNIFICANT CHANGE UP (ref 3.8–10.5)

## 2023-04-27 PROCEDURE — 95718 EEG PHYS/QHP 2-12 HR W/VEEG: CPT

## 2023-04-27 PROCEDURE — 99232 SBSQ HOSP IP/OBS MODERATE 35: CPT

## 2023-04-27 PROCEDURE — 33285 INSJ SUBQ CAR RHYTHM MNTR: CPT

## 2023-04-27 PROCEDURE — 99233 SBSQ HOSP IP/OBS HIGH 50: CPT

## 2023-04-27 PROCEDURE — ZZZZZ: CPT

## 2023-04-27 RX ORDER — HYDRALAZINE HCL 50 MG
5 TABLET ORAL ONCE
Refills: 0 | Status: DISCONTINUED | OUTPATIENT
Start: 2023-04-27 | End: 2023-04-27

## 2023-04-27 RX ORDER — CLOPIDOGREL BISULFATE 75 MG/1
75 TABLET, FILM COATED ORAL DAILY
Refills: 0 | Status: DISCONTINUED | OUTPATIENT
Start: 2023-04-27 | End: 2023-04-28

## 2023-04-27 RX ORDER — LISINOPRIL 2.5 MG/1
10 TABLET ORAL DAILY
Refills: 0 | Status: DISCONTINUED | OUTPATIENT
Start: 2023-04-27 | End: 2023-04-28

## 2023-04-27 RX ADMIN — LISINOPRIL 10 MILLIGRAM(S): 2.5 TABLET ORAL at 11:06

## 2023-04-27 RX ADMIN — ENOXAPARIN SODIUM 40 MILLIGRAM(S): 100 INJECTION SUBCUTANEOUS at 19:30

## 2023-04-27 RX ADMIN — CLOPIDOGREL BISULFATE 75 MILLIGRAM(S): 75 TABLET, FILM COATED ORAL at 11:06

## 2023-04-27 RX ADMIN — ATORVASTATIN CALCIUM 80 MILLIGRAM(S): 80 TABLET, FILM COATED ORAL at 22:56

## 2023-04-27 RX ADMIN — Medication 2: at 16:40

## 2023-04-27 RX ADMIN — Medication 81 MILLIGRAM(S): at 11:06

## 2023-04-27 RX ADMIN — Medication 2: at 22:56

## 2023-04-27 NOTE — PROGRESS NOTE ADULT - SUBJECTIVE AND OBJECTIVE BOX
Neurology Stroke Progress Note    INTERVAL HPI/OVERNIGHT EVENTS:  Patient seen and examined. Spoke with patient's ex-partner and patient's cognitive status appears to be at baseline; she reports that he has had progressive decline over the last 3-4 years. MRI performed overnight demonstrates acute infarcts in right thalamus and brainstem. Added Plavix 75mg daily.     MEDICATIONS  (STANDING):  aspirin enteric coated 81 milliGRAM(s) Oral daily  atorvastatin 80 milliGRAM(s) Oral at bedtime  clopidogrel Tablet 75 milliGRAM(s) Oral daily  dextrose 5%. 1000 milliLiter(s) (50 mL/Hr) IV Continuous <Continuous>  dextrose 5%. 1000 milliLiter(s) (100 mL/Hr) IV Continuous <Continuous>  dextrose 50% Injectable 12.5 Gram(s) IV Push once  dextrose 50% Injectable 25 Gram(s) IV Push once  dextrose 50% Injectable 25 Gram(s) IV Push once  enoxaparin Injectable 40 milliGRAM(s) SubCutaneous every 24 hours  glucagon  Injectable 1 milliGRAM(s) IntraMuscular once  insulin lispro (ADMELOG) corrective regimen sliding scale   SubCutaneous Before meals and at bedtime  lisinopril 10 milliGRAM(s) Oral daily    MEDICATIONS  (PRN):  dextrose Oral Gel 15 Gram(s) Oral once PRN Blood Glucose LESS THAN 70 milliGRAM(s)/deciliter      Allergies    No Known Allergies    Intolerances        Vital Signs Last 24 Hrs  T(C): 36.9 (27 Apr 2023 09:10), Max: 37.1 (26 Apr 2023 22:12)  T(F): 98.4 (27 Apr 2023 09:10), Max: 98.7 (26 Apr 2023 22:12)  HR: 65 (27 Apr 2023 12:25) (59 - 65)  BP: 169/86 (27 Apr 2023 12:25) (135/78 - 189/98)  BP(mean): 117 (27 Apr 2023 12:25) (100 - 136)  RR: 18 (27 Apr 2023 12:25) (17 - 18)  SpO2: 100% (27 Apr 2023 12:25) (97% - 100%)    Parameters below as of 27 Apr 2023 12:25  Patient On (Oxygen Delivery Method): room air        Physical exam:  General: No acute distress, awake and alert  Eyes: Anicteric sclerae, moist conjunctivae, see below for CNs  Neck: trachea midline, FROM, supple, no thyromegaly or lymphadenopathy  Cardiovascular: Regular rate and rhythm, no murmurs, rubs, or gallops. No carotid bruits. Multiple PVC's ordered.   Pulmonary: Anterior breath sounds clear bilaterally, no crackles or wheezing. No use of accessory muscles  GI: Abdomen soft, non-distended, non-tender  Extremities: Radial and DP pulses +2, no edema    Neurologic:  -Mental status: Awake, alert, oriented to person, place, and time. Speech is slow but fluent with intact naming, repetition, no dysarthria. Follows simple commands.. Attention/concentration intact.   -Cranial nerves:   II: Visual fields intact, appears to have some difficulty identifying examiners fingers in right upper and lower quadrants of right eye.   III, IV, VI: Extraocular movements are intact without nystagmus. Pupils equally round and reactive to light  V:  Facial sensation V1-V3 equal and intact   VII: Face is symmetric with normal eye closure and smile  Motor: Normal bulk and tone. No pronator drift. RUE 5/5, LUE 4/5 with mild drift, bilateral lower extremities 5/5.  Sensation: Intact to light touch bilaterally. No neglect or extinction on double simultaneous testing.  Coordination: No dysmetria on R finger-to-nose; some dysmetria noted on with L hand possibly due secondary to LUE weakness        LABS:                        10.8   5.47  )-----------( 181      ( 27 Apr 2023 07:21 )             37.5     04-27    136  |  102  |  15  ----------------------------<  100<H>  4.0   |  24  |  0.84    Ca    9.3      27 Apr 2023 07:21  Phos  3.6     04-27  Mg     2.2     04-27    TPro  7.2  /  Alb  3.7  /  TBili  0.2  /  DBili  x   /  AST  12  /  ALT  6<L>  /  AlkPhos  58  04-27    PT/INR - ( 25 Apr 2023 15:06 )   PT: 13.2 sec;   INR: 1.11          PTT - ( 25 Apr 2023 15:06 )  PTT:25.2 sec      RADIOLOGY & ADDITIONAL TESTS:  < from: MR Head w/wo IV Cont (04.26.23 @ 23:37) >  IMPRESSION:    DWI positive for small recent infarcts in the right thalamus and   brainstem and pontomesencephalic junction. 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    No intracranial mass or abnormal enhancement.    Right suboccipital region subcutaneous mass is fat signal with septated   and locular appearance consistent with a lipoma; no enhancing nodules to   imply liposarcoma though please notethis is NOT a diagnosis excluded   made on imaging. Any growing fatty mass would be of greater concern.   Clinical exam follow-up advised.      < end of copied text >

## 2023-04-27 NOTE — PROGRESS NOTE ADULT - SUBJECTIVE AND OBJECTIVE BOX
INTERVAL HPI/OVERNIGHT EVENTS: eri o/n    SUBJECTIVE: Patient seen and examined at bedside.   Pt resting comfortably. Denies any headache, vision changes, LH/Dizziness, numbness. No chest pain, dyspnea, N/V/Abd pain.  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 Reports h/o seizure in setting of stroke in 2002 and being on anti-seizure medication at that time. However denies remembering name/dosage/frequency    OBJECTIVE:    VITAL SIGNS:  ICU Vital Signs Last 24 Hrs  T(C): 37.1 (27 Apr 2023 13:36), Max: 37.1 (26 Apr 2023 22:12)  T(F): 98.7 (27 Apr 2023 13:36), Max: 98.7 (26 Apr 2023 22:12)  HR: 65 (27 Apr 2023 12:25) (59 - 65)  BP: 169/86 (27 Apr 2023 12:25) (135/78 - 189/98)  BP(mean): 117 (27 Apr 2023 12:25) (100 - 136)  ABP: --  ABP(mean): --  RR: 18 (27 Apr 2023 12:25) (17 - 18)  SpO2: 100% (27 Apr 2023 12:25) (97% - 100%)    O2 Parameters below as of 27 Apr 2023 12:25  Patient On (Oxygen Delivery Method): room air              04-26 @ 07:01 - 04-27 @ 07:00  --------------------------------------------------------  IN: 626 mL / OUT: 800 mL / NET: -174 mL    04-27 @ 07:01 - 04-27 @ 17:32  --------------------------------------------------------  IN: 620 mL / OUT: 850 mL / NET: -230 mL      CAPILLARY BLOOD GLUCOSE  307 (25 Apr 2023 17:48)      POCT Blood Glucose.: 160 mg/dL (27 Apr 2023 16:38)      PHYSICAL EXAM:  GEN: Male in NAD on RA  HEENT: NC/AT, MMM  CV: RRR, nml S1S2, no murmurs  PULM: nml effort, CTAB  ABD: Soft, non-distended, NABS, non-tender  NEURO  A/O x3,   EOMI - there is a faint L sided droop present  5/5 in BUE and BLE  nml finger to nose - no dysmetria.   sensation intact  PSYCH: Appropriate    MEDICATIONS:  MEDICATIONS  (STANDING):  aspirin enteric coated 81 milliGRAM(s) Oral daily  atorvastatin 80 milliGRAM(s) Oral at bedtime  clopidogrel Tablet 75 milliGRAM(s) Oral daily  dextrose 5%. 1000 milliLiter(s) (50 mL/Hr) IV Continuous <Continuous>  dextrose 5%. 1000 milliLiter(s) (100 mL/Hr) IV Continuous <Continuous>  dextrose 50% Injectable 12.5 Gram(s) IV Push once  dextrose 50% Injectable 25 Gram(s) IV Push once  dextrose 50% Injectable 25 Gram(s) IV Push once  enoxaparin Injectable 40 milliGRAM(s) SubCutaneous every 24 hours  glucagon  Injectable 1 milliGRAM(s) IntraMuscular once  insulin lispro (ADMELOG) corrective regimen sliding scale   SubCutaneous Before meals and at bedtime  lisinopril 10 milliGRAM(s) Oral daily    MEDICATIONS  (PRN):  dextrose Oral Gel 15 Gram(s) Oral once PRN Blood Glucose LESS THAN 70 milliGRAM(s)/deciliter      ALLERGIES:  Allergies    No Known Allergies    Intolerances        LABS:                        10.8   5.47  )-----------( 181      ( 27 Apr 2023 07:21 )             37.5     04-27    136  |  102  |  15  ----------------------------<  100<H>  4.0   |  24  |  0.84    Ca    9.3      27 Apr 2023 07:21  Phos  3.6     04-27  Mg     2.2     04-27    TPro  7.2  /  Alb  3.7  /  TBili  0.2  /  DBili  x   /  AST  12  /  ALT  6<L>  /  AlkPhos  58  04-27          RADIOLOGY & ADDITIONAL TESTS: Reviewed.

## 2023-04-27 NOTE — PROGRESS NOTE ADULT - SUBJECTIVE AND OBJECTIVE BOX
Electrophysiology Loop Recorder implant procedure note:     Diagnosis: stroke   s/p Loop Recorder Implant.  Used local anesthestic. Wound closed with Dermabond  Model: LINQ II  Serial number:  [id number]   Follow up with Dr. Shazia Ruggiero on 6/5 at 1:30 PM ---- [address], [address].  [phone]  He can wet wound tomorrow. He was given instruction on how to use the Home Remote Monitoring system for his LINQ.   His HCP Mrs Brand was at bedside to listen in on this instruction.

## 2023-04-27 NOTE — CONSULT NOTE ADULT - ASSESSMENT
46 y/o Male with PMHx of HTN, HLD, DM, CVA in 2022 with residual left sided deficits, ?seizure history not on any meds, p/w confusion / weakness, admitted for stroke.  Patient out of the window for Tenecteplase and not a candidate for mechanical thrombectomy.  MRI brain showed small recent infarcts in the right thalamus and brainstem and pontomesencephalic junction.  EKG and telemetry here with NSR.  No afib/flutter.  EPS is asked for ILR for cryptogenic stroke.   - Pt was explained to utility of an ILR.  He states that he doesn't have a cellphone or landline at home. Knows that he needs to get a cellphone. As such, I explain that he would need to come see us in office every 6 weeks (until he gets a cellphone) in case there are arrhythmia events noted on Remote Home Monitor but unable to reach out to him via phone.  He understands this requirement.  He knows that he needs to get a cellphone soon.   - He can f/u with Dr. Doung Sargent on 6/1 at 1:40 PM ---- [address], 2nd [address].  [phone] 46 y/o Male with PMHx of HTN, HLD, DM, CVA in 2022 with residual left sided deficits, ?seizure history not on any meds, p/w confusion / weakness, admitted for stroke.  Patient out of the window for Tenecteplase and not a candidate for mechanical thrombectomy.  MRI brain showed small recent infarcts in the right thalamus and brainstem and pontomesencephalic junction.  EKG and telemetry here with NSR.  No afib/flutter.  EPS is asked for ILR for cryptogenic stroke.   - Pt was explained to utility of an ILR.  He states that he doesn't have a cellphone or landline at home. Knows that he needs to get a cellphone. As such, I explain that he would need to come see us in office every 6 weeks (until he gets a cellphone) in case there are arrhythmia events noted on Remote Home Monitor but unable to reach out to him via phone.  He understands this requirement.  He knows that he needs to get a cellphone soon.   - He can f/u with Dr. Shazia Ruggiero on 6/5 at 1:30 PM ---- [address], 2nd floor.  [phone] 48 y/o Male with PMHx of HTN, HLD, DM, CVA in 2022 with residual left sided deficits, ?seizure history not on any meds, p/w confusion / weakness, admitted for stroke.  Patient out of the window for Tenecteplase and not a candidate for mechanical thrombectomy.  MRI brain showed small recent infarcts in the right thalamus and brainstem and pontomesencephalic junction.  EKG and telemetry here with NSR.  No afib/flutter.  EPS is asked for ILR for cryptogenic stroke.   - Pt was explained to utility of an ILR.  He states that he doesn't have a cellphone or landline at home. Knows that he needs to get a cellphone.  Fortunately his HCP is (Ms. Hakeem Brand) is at bedside and states that she would ensure him every clinic follow up.  She can be reached if there is any alert on his Remote monitor.  -- Email: [email or web address].  [phone]  - He can f/u with Dr. Shazia Ruggiero on 6/5 at 1:30 PM ---- [address], 2nd floor.  [phone]

## 2023-04-27 NOTE — PROGRESS NOTE ADULT - ASSESSMENT
PCP: Earnest Deleon. Pharmacy - [facility] in the [place]  47M w CVA in 2022 w residual L sided deficits, HTN, HLD, DM2, p/w syncopal episode and confusion in outpatient office, SBP 200s, NIHSS 4, found to have chronic L occipital, R BG and Thalamic infarction, CTA w severe L P2 stenosis, mod-severe narrowing of L Superior M2, admitted for CVA/TIA work-up, MR shows recent infarction in R thalamus and chronic basal ganglia stroke    #CVA/TIA evaluation - MR shows recent infarction in R thalamus and chronic basal ganglia stroke  #h/o CVA  A1C 7. LDL 89. TSH 2.24  on ASA    #Encephalopathy - UDS positive for THC  #HTN - BP -180 today. BP target per stroke   - started on lisinopril 10mg daily  #HLD - on atorva 40  #DM2 - A1C 7. Home on metformin  #Microcytic anemia - 10.8 from 11 today. MCV 75  #R suboccipital lipomatous lesion - 5.4cm seen on CTA head and MRI    Recommend  Would add on Iron studies  f/u TTE/MATT    DISPO: PT recommend home no needs

## 2023-04-27 NOTE — CONSULT NOTE ADULT - SUBJECTIVE AND OBJECTIVE BOX
Electrophysiology Consult Note:     CHIEF COMPLAINT:  Patient is a 47y old  Male who presents with a chief complaint of sudden onset headache and syncopal episode with confusion (2023 14:22)        HISTORY OF PRESENT ILLNESS:   46 y/o Male with PMHx of HTN, HLD, DM, CVA in  with residual left sided deficits (not on aspirin), ?seizure history not on any meds, sent to ER from his PCP (DR. Earnest Deleon's office) for weakness and fogginess. Denies loss of consciousness.  Per chart note, EMS noted hypertensive to the 200's (not on any BP meds at home). Stroke code was called on arrival to the ED. NIHSS 4. CTH w/ chronic infarcts left occipital, right basal ganglia and thalamic region. CTA w/ severe stenosis of the left P2, moderate to severe focal narrowing of the left superior M2, short segmental mild to moderate narrowing of the right inferior M2. CTP w/ elevated Tmax in the right frontal lobe that may be artifact. Patient out of the window for Tenecteplase and not a candidate for mechanical thrombectomy.   MRI brain with and without contrast: small recent infarcts in the right thalamus and brainstem and pontomesencephalic junction.  EPS is consulting for loop recorder implant for long term heart rhythm monitor (ie afib/flutter) given recurrent stroke.   He states that he takes Metformin at home.   Denies palpitations, CP, SOB.  States that he doesn't have a cellphone or landline at home. Knows that he needs to get a cellphone.         PAST MEDICAL & SURGICAL HISTORY:  DM (diabetes mellitus)    FAMILY HISTORY:  Denies family h/o CVA/ heart disease    SOCIAL HISTORY:    on/off smoker   occasional etoh   denies illicit drugs    Allergies  No Known Allergies        MEDICATIONS  (STANDING):  aspirin enteric coated 81 milliGRAM(s) Oral daily  atorvastatin 80 milliGRAM(s) Oral at bedtime  clopidogrel Tablet 75 milliGRAM(s) Oral daily  enoxaparin Injectable 40 milliGRAM(s) SubCutaneous every 24 hours  glucagon  Injectable 1 milliGRAM(s) IntraMuscular once  insulin lispro (ADMELOG) corrective regimen sliding scale   SubCutaneous Before meals and at bedtime  lisinopril 10 milliGRAM(s) Oral daily    MEDICATIONS  (PRN):  dextrose Oral Gel 15 Gram(s) Oral once PRN Blood Glucose LESS THAN 70 milliGRAM(s)/deciliter        REVIEW OF SYSTEMS:  CONSTITUTIONAL: No fever, weight loss. + fatigue  EYES: No eye pain, visual disturbances, or discharge  ENMT:  No difficulty hearing, tinnitus, vertigo; No sinus or throat pain  NECK: No pain or stiffness  RESPIRATORY: No cough, wheezing, chills or hemoptysis; No Shortness of Breath  CARDIOVASCULAR: No chest pain, palpitations, dizziness, or leg swelling  GASTROINTESTINAL: No abdominal or epigastric pain. No nausea, vomiting, or hematemesis; No diarrhea or constipation. No melena or hematochezia.  GENITOURINARY: No dysuria, frequency, hematuria, or incontinence  NEUROLOGICAL: see hpi.   SKIN: No itching, burning, rashes, or lesions   LYMPH Nodes: No enlarged glands  ENDOCRINE: No heat or cold intolerance; No hair loss  MUSCULOSKELETAL: No joint pain or swelling; No muscle, back, or extremity pain  PSYCHIATRIC: No depression, anxiety, mood swings, or difficulty sleeping  HEME/LYMPH: No easy bruising, or bleeding gums  ALLERY AND IMMUNOLOGIC: No hives or eczema	      PHYSICAL EXAM:  Vital Signs Last 24 Hrs  T(C): 37.1 (2023 13:36), Max: 37.1 (2023 22:12)  T(F): 98.7 (2023 13:36), Max: 98.7 (2023 22:12)  HR: 65 (2023 12:25) (59 - 65)  BP: 169/86 (2023 12:25) (135/78 - 189/98)  BP(mean): 117 (2023 12:25) (100 - 136)  RR: 18 (2023 12:25) (17 - 18)  SpO2: 100% (2023 12:25) (97% - 100%)    Parameters below as of 2023 12:25  Patient On (Oxygen Delivery Method): room air      Constitutional: NAD	  HEENT:   Normal oral mucosa  CVS: Normal S1 / S2, RRR, No murmurs  Pulm: CTA. No wheeze or rale  GI:  + BS, soft, NT / ND   Ext: No LE edema  Vascular: Peripheral pulses palpable 2+ bilaterally  Neurologic: A&O x 3  Psych: Pleasant  Skin: No rash or lesion       	  LABS:	                         10.8   5.47  )-----------( 181      ( 2023 07:21 )             37.5         136  |  102  |  15  ----------------------------<  100<H>  4.0   |  24  |  0.84    Ca    9.3      2023 07:21  Phos  3.6       Mg     2.2         TPro  7.2  /  Alb  3.7  /  TBili  0.2  /  DBili  x   /  AST  12  /  ALT  6<L>  /  AlkPhos  58      EK23: NSR 68 bpm. Normal intervals and axis.   ms.  ms. QTc 435 ms.   Telemetry: NSR HR 60-70s.  Occasional single PVC.

## 2023-04-27 NOTE — PROGRESS NOTE ADULT - ASSESSMENT
47y Male with PMHx of HTN, HLD, DM, CVA in 2022 with residual left sided deficits(not on aspirin), prior seizure in 2022 (not on any meds) presented to [facility] ED from ambulatory office after a sudden onset headache and syncopal episode with confusion. History in ED was obtained from chart review as the patient is not a good historian and emergency contact was unable to be reached. Stroke code called on arrival to ED. NIHSS 4. CTH w/ chronic infarcts left occipital, right basal ganglia and thalamic region. CTA w/ severe stenosis of the left P2, moderate to severe focal narrowing of the left superior M2, short segmental mild to moderate narrowing of the right inferior M2. 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 CTP w/ elevated Tmax in the right frontal lobe that may be artifact. Patient out of the window for Tenecteplase and not a candidate for mechanical thrombectomy. Pt started on aspirin and atorvastatin and admitted to stroke service for further workup with MRI with contrast, TTE and EEG.    Neuro  #CVA vs. Hypertensive urgency   - s/p 325mg aspirin in the field  - continue aspirin 81mg, added 75mg Plavix QD  - continue atorvastatin 80mg daily  - q4hr stroke neuro checks and vitals  - MRI brain with and without contrast: small recent infarcts in the right thalamus and   brainstem and pontomesencephalic junction.  - Stroke Code HCT Results: chronic infarcts left occipital, right basal ganglia and thalamic region  - Stroke Code CTA Results: severe stenosis of the left P2, moderate to severe focal narrowing of the left superior M2, short segmental mild to moderate narrowing of the right inferior M2  - Stroke education  - EEG to r/o seizures- per outside documentation patient has history of seizures, not on any meds - negative  - hypercoag labs ordered, antithrombin III 75, will need to be rechecked outpatient    Cards  #HTN  - Goal -180  - Patient hypertensive in ED, hydralazine PRN to maintain SBP < 180  - started lisinopril 10mg QD  - NPO at MN for MATT, EP consulted 4/27 for ILR  - obtain TTE with bubble  - Stroke Code EKG Results: NSR    #HLD  - high dose statin as above in CVA  - LDL results: 89  - Home medication: rosuvastatin 10mg      Pulm  - call provider if SPO2 < 94%    GI  #Nutrition/Fluids/Electrolytes   - replete K<4 and Mg <2  - Diet: Consistent Carb/DASH  - IVF: none for now    Renal  - LIZ    Infectious Disease  - Stroke Code CXR results: No acute pathology    Endocrine  #DM  - A1C results: 7.0  - Random BS: 254 on 4/26  - Moderate ISS  - home medication: metformin 500mg once daily     - TSH results: 2.240    DVT Prophylaxis  - lovenox sq for DVT prophylaxis   - SCDs for DVT prophylaxis       IDR Goals: Goals reviewed at interdisciplinary rounds with case management, social work, physical therapy, occupational therapy, and speech language pathology.   Please see specific therapy  notes for in depth goals.    Dispo: Home; No skilled PT needs     Discussed daily hospital plans and goals with patient at bedside.    Discussed with Neurology Attending Dr. Ene Hernandez

## 2023-04-27 NOTE — PROGRESS NOTE ADULT - NS ATTEND AMEND GEN_ALL_CORE FT
The patient is a 47-year-old gentleman with a history of hypertension, hyperlipidemia, type 2 diabetes, prior stroke with ?residual left-sided weakness (not on aspirin, details unclear), and possible seizure around the time of his stroke who was admitted after presenting with sudden-onset, severe headache associated with dizziness and syncope/loss of consciousness followed by a period of confusion with gradual improvement in s/o 's. Initial imaging was unrevealing for acute findings but did show evidence of prior chronic strokes as well as scattered ICAD of moderate to severe severity. MRI with + multiple small acute infarcts of the posterior circulation. TTE/MATT, ILR pending. Hypercoag largely pending.  Continue DAPT, statin.  BP goal: gradual normotension.
The patient is a 47-year-old gentleman with a history of hypertension, hyperlipidemia, type 2 diabetes, prior stroke with residual left-sided weakness (not on aspirin, details unclear), and possible seizure around the time of his stroke who was admitted after presenting with sudden-onset, severe headache associated with dizziness and syncope/loss of consciousness followed by a period of confusion with gradual improvement.  He is now at baseline. Of note, systolic blood pressure 230s upon arrival. Initial imaging was unrevealing for acute findings but did show evidence of prior chronic strokes as well as scattered ICAD of moderate to severe severity.  Etiology of presentation is not entirely clear but could have been related to hypertensive emergency, seizure, or less likely stroke given lack of focal symptoms. EEG was negative for epileptiform discharges overnight.  Plan for MRI (w/ and w/o contrast to better eval ?liposarcoma vs lipoma), TTE.  Start aspirin, statin therapy.  Can consider Plavix pending MRI.  Hypercoagulable studies drawn given stroke at a young age.

## 2023-04-28 ENCOUNTER — TRANSCRIPTION ENCOUNTER (OUTPATIENT)
Age: 48
End: 2023-04-28

## 2023-04-28 VITALS — TEMPERATURE: 98 F

## 2023-04-28 LAB
ANION GAP SERPL CALC-SCNC: 8 MMOL/L — SIGNIFICANT CHANGE UP (ref 5–17)
APCR PPP: 2.29 RATIO — SIGNIFICANT CHANGE UP
BUN SERPL-MCNC: 18 MG/DL — SIGNIFICANT CHANGE UP (ref 7–23)
CALCIUM SERPL-MCNC: 9.4 MG/DL — SIGNIFICANT CHANGE UP (ref 8.4–10.5)
CARDIOLIPIN AB SER-ACNC: NEGATIVE — SIGNIFICANT CHANGE UP
CHLORIDE SERPL-SCNC: 103 MMOL/L — SIGNIFICANT CHANGE UP (ref 96–108)
CO2 SERPL-SCNC: 27 MMOL/L — SIGNIFICANT CHANGE UP (ref 22–31)
CONFIRM APTT STACLOT: NEGATIVE — SIGNIFICANT CHANGE UP
CREAT SERPL-MCNC: 0.85 MG/DL — SIGNIFICANT CHANGE UP (ref 0.5–1.3)
DRVVT RATIO: 0.88 RATIO — SIGNIFICANT CHANGE UP (ref 0–1.03)
DRVVT SCREEN TO CONFIRM RATIO: SIGNIFICANT CHANGE UP
EGFR: 107 ML/MIN/1.73M2 — SIGNIFICANT CHANGE UP
FERRITIN SERPL-MCNC: 350 NG/ML — SIGNIFICANT CHANGE UP (ref 30–400)
GLUCOSE BLDC GLUCOMTR-MCNC: 100 MG/DL — HIGH (ref 70–99)
GLUCOSE BLDC GLUCOMTR-MCNC: 104 MG/DL — HIGH (ref 70–99)
GLUCOSE BLDC GLUCOMTR-MCNC: 173 MG/DL — HIGH (ref 70–99)
GLUCOSE SERPL-MCNC: 96 MG/DL — SIGNIFICANT CHANGE UP (ref 70–99)
HCT VFR BLD CALC: 36.3 % — LOW (ref 39–50)
HGB BLD-MCNC: 11 G/DL — LOW (ref 13–17)
IRON SATN MFR SERPL: 33 % — SIGNIFICANT CHANGE UP (ref 16–55)
IRON SATN MFR SERPL: 68 UG/DL — SIGNIFICANT CHANGE UP (ref 45–165)
MAGNESIUM SERPL-MCNC: 1.9 MG/DL — SIGNIFICANT CHANGE UP (ref 1.6–2.6)
MCHC RBC-ENTMCNC: 23.2 PG — LOW (ref 27–34)
MCHC RBC-ENTMCNC: 30.3 GM/DL — LOW (ref 32–36)
MCV RBC AUTO: 76.6 FL — LOW (ref 80–100)
NRBC # BLD: 0 /100 WBCS — SIGNIFICANT CHANGE UP (ref 0–0)
PHOSPHATE SERPL-MCNC: 4.1 MG/DL — SIGNIFICANT CHANGE UP (ref 2.5–4.5)
PLATELET # BLD AUTO: 217 K/UL — SIGNIFICANT CHANGE UP (ref 150–400)
POTASSIUM SERPL-MCNC: 4.1 MMOL/L — SIGNIFICANT CHANGE UP (ref 3.5–5.3)
POTASSIUM SERPL-SCNC: 4.1 MMOL/L — SIGNIFICANT CHANGE UP (ref 3.5–5.3)
RBC # BLD: 4.74 M/UL — SIGNIFICANT CHANGE UP (ref 4.2–5.8)
RBC # FLD: 14.4 % — SIGNIFICANT CHANGE UP (ref 10.3–14.5)
RETICS #: 28.1 K/UL — SIGNIFICANT CHANGE UP (ref 25–125)
RETICS/RBC NFR: 0.6 % — SIGNIFICANT CHANGE UP (ref 0.5–2.5)
SODIUM SERPL-SCNC: 138 MMOL/L — SIGNIFICANT CHANGE UP (ref 135–145)
TIBC SERPL-MCNC: 208 UG/DL — LOW (ref 220–430)
TRANSFERRIN SERPL-MCNC: 188 MG/DL — LOW (ref 200–360)
UIBC SERPL-MCNC: 140 UG/DL — SIGNIFICANT CHANGE UP (ref 110–370)
WBC # BLD: 5.8 K/UL — SIGNIFICANT CHANGE UP (ref 3.8–10.5)
WBC # FLD AUTO: 5.8 K/UL — SIGNIFICANT CHANGE UP (ref 3.8–10.5)

## 2023-04-28 PROCEDURE — 83540 ASSAY OF IRON: CPT

## 2023-04-28 PROCEDURE — 80053 COMPREHEN METABOLIC PANEL: CPT

## 2023-04-28 PROCEDURE — A9585: CPT

## 2023-04-28 PROCEDURE — 85307 ASSAY ACTIVATED PROTEIN C: CPT

## 2023-04-28 PROCEDURE — 80061 LIPID PANEL: CPT

## 2023-04-28 PROCEDURE — 85300 ANTITHROMBIN III ACTIVITY: CPT

## 2023-04-28 PROCEDURE — 70498 CT ANGIOGRAPHY NECK: CPT | Mod: MA

## 2023-04-28 PROCEDURE — 80048 BASIC METABOLIC PNL TOTAL CA: CPT

## 2023-04-28 PROCEDURE — 97162 PT EVAL MOD COMPLEX 30 MIN: CPT

## 2023-04-28 PROCEDURE — 36415 COLL VENOUS BLD VENIPUNCTURE: CPT

## 2023-04-28 PROCEDURE — 71045 X-RAY EXAM CHEST 1 VIEW: CPT

## 2023-04-28 PROCEDURE — 83036 HEMOGLOBIN GLYCOSYLATED A1C: CPT

## 2023-04-28 PROCEDURE — 70496 CT ANGIOGRAPHY HEAD: CPT | Mod: MA

## 2023-04-28 PROCEDURE — 84484 ASSAY OF TROPONIN QUANT: CPT

## 2023-04-28 PROCEDURE — 82728 ASSAY OF FERRITIN: CPT

## 2023-04-28 PROCEDURE — 85598 HEXAGNAL PHOSPH PLTLT NEUTRL: CPT

## 2023-04-28 PROCEDURE — 0042T: CPT | Mod: MA

## 2023-04-28 PROCEDURE — 99285 EMERGENCY DEPT VISIT HI MDM: CPT

## 2023-04-28 PROCEDURE — 84443 ASSAY THYROID STIM HORMONE: CPT

## 2023-04-28 PROCEDURE — 93306 TTE W/DOPPLER COMPLETE: CPT | Mod: 26

## 2023-04-28 PROCEDURE — 85301 ANTITHROMBIN III ANTIGEN: CPT

## 2023-04-28 PROCEDURE — 83550 IRON BINDING TEST: CPT

## 2023-04-28 PROCEDURE — 76376 3D RENDER W/INTRP POSTPROCES: CPT | Mod: 26

## 2023-04-28 PROCEDURE — 93306 TTE W/DOPPLER COMPLETE: CPT

## 2023-04-28 PROCEDURE — 85303 CLOT INHIBIT PROT C ACTIVITY: CPT

## 2023-04-28 PROCEDURE — 83090 ASSAY OF HOMOCYSTEINE: CPT

## 2023-04-28 PROCEDURE — 83735 ASSAY OF MAGNESIUM: CPT

## 2023-04-28 PROCEDURE — C1764: CPT

## 2023-04-28 PROCEDURE — 85045 AUTOMATED RETICULOCYTE COUNT: CPT

## 2023-04-28 PROCEDURE — 85025 COMPLETE CBC W/AUTO DIFF WBC: CPT

## 2023-04-28 PROCEDURE — 70553 MRI BRAIN STEM W/O & W/DYE: CPT

## 2023-04-28 PROCEDURE — 93312 ECHO TRANSESOPHAGEAL: CPT

## 2023-04-28 PROCEDURE — 99233 SBSQ HOSP IP/OBS HIGH 50: CPT

## 2023-04-28 PROCEDURE — 80307 DRUG TEST PRSMV CHEM ANLYZR: CPT

## 2023-04-28 PROCEDURE — 93312 ECHO TRANSESOPHAGEAL: CPT | Mod: 26

## 2023-04-28 PROCEDURE — 82962 GLUCOSE BLOOD TEST: CPT

## 2023-04-28 PROCEDURE — 84466 ASSAY OF TRANSFERRIN: CPT

## 2023-04-28 PROCEDURE — 97165 OT EVAL LOW COMPLEX 30 MIN: CPT

## 2023-04-28 PROCEDURE — 85027 COMPLETE CBC AUTOMATED: CPT

## 2023-04-28 PROCEDURE — 97116 GAIT TRAINING THERAPY: CPT

## 2023-04-28 PROCEDURE — 99232 SBSQ HOSP IP/OBS MODERATE 35: CPT

## 2023-04-28 PROCEDURE — 70450 CT HEAD/BRAIN W/O DYE: CPT | Mod: MA

## 2023-04-28 PROCEDURE — 85730 THROMBOPLASTIN TIME PARTIAL: CPT

## 2023-04-28 PROCEDURE — 85306 CLOT INHIBIT PROT S FREE: CPT

## 2023-04-28 PROCEDURE — 85610 PROTHROMBIN TIME: CPT

## 2023-04-28 PROCEDURE — 85613 RUSSELL VIPER VENOM DILUTED: CPT

## 2023-04-28 PROCEDURE — 95713 VEEG 2-12 HR CONT MNTR: CPT

## 2023-04-28 PROCEDURE — 86146 BETA-2 GLYCOPROTEIN ANTIBODY: CPT

## 2023-04-28 PROCEDURE — 84100 ASSAY OF PHOSPHORUS: CPT

## 2023-04-28 PROCEDURE — 86147 CARDIOLIPIN ANTIBODY EA IG: CPT

## 2023-04-28 PROCEDURE — 95700 EEG CONT REC W/VID EEG TECH: CPT

## 2023-04-28 RX ORDER — METFORMIN HYDROCHLORIDE 850 MG/1
1 TABLET ORAL
Refills: 0 | DISCHARGE

## 2023-04-28 RX ORDER — ROSUVASTATIN CALCIUM 5 MG/1
1 TABLET ORAL
Refills: 0 | DISCHARGE

## 2023-04-28 RX ORDER — METFORMIN HYDROCHLORIDE 850 MG/1
1 TABLET ORAL
Qty: 30 | Refills: 0
Start: 2023-04-28

## 2023-04-28 RX ORDER — ASPIRIN/CALCIUM CARB/MAGNESIUM 324 MG
1 TABLET ORAL
Qty: 30 | Refills: 0
Start: 2023-04-28

## 2023-04-28 RX ORDER — CLOPIDOGREL BISULFATE 75 MG/1
1 TABLET, FILM COATED ORAL
Qty: 30 | Refills: 0
Start: 2023-04-28

## 2023-04-28 RX ORDER — ATORVASTATIN CALCIUM 80 MG/1
1 TABLET, FILM COATED ORAL
Qty: 30 | Refills: 0
Start: 2023-04-28

## 2023-04-28 RX ORDER — LISINOPRIL 2.5 MG/1
20 TABLET ORAL DAILY
Refills: 0 | Status: DISCONTINUED | OUTPATIENT
Start: 2023-04-29 | End: 2023-04-28

## 2023-04-28 RX ORDER — LISINOPRIL 2.5 MG/1
10 TABLET ORAL ONCE
Refills: 0 | Status: COMPLETED | OUTPATIENT
Start: 2023-04-28 | End: 2023-04-28

## 2023-04-28 RX ORDER — ATORVASTATIN CALCIUM 80 MG/1
1 TABLET, FILM COATED ORAL
Refills: 0 | DISCHARGE

## 2023-04-28 RX ORDER — LISINOPRIL 2.5 MG/1
1 TABLET ORAL
Qty: 30 | Refills: 0
Start: 2023-04-28

## 2023-04-28 RX ADMIN — LISINOPRIL 10 MILLIGRAM(S): 2.5 TABLET ORAL at 06:51

## 2023-04-28 RX ADMIN — Medication 81 MILLIGRAM(S): at 12:51

## 2023-04-28 RX ADMIN — Medication 2: at 16:59

## 2023-04-28 RX ADMIN — LISINOPRIL 10 MILLIGRAM(S): 2.5 TABLET ORAL at 12:51

## 2023-04-28 RX ADMIN — CLOPIDOGREL BISULFATE 75 MILLIGRAM(S): 75 TABLET, FILM COATED ORAL at 12:51

## 2023-04-28 NOTE — PROGRESS NOTE ADULT - SUBJECTIVE AND OBJECTIVE BOX
INTERVAL HPI/OVERNIGHT EVENTS: eri o/n    SUBJECTIVE: Patient seen and examined at bedside.   Pt seen after MATT. Feels well. No complaints of headache, chest pain, dyspnea. No N/V/Abd pain, lightheadedness, or dizziness    OBJECTIVE:    VITAL SIGNS:  ICU Vital Signs Last 24 Hrs  T(C): 37.2 (28 Apr 2023 13:34), Max: 37.3 (27 Apr 2023 21:43)  T(F): 98.9 (28 Apr 2023 13:34), Max: 99.1 (27 Apr 2023 21:43)  HR: 55 (28 Apr 2023 12:53) (52 - 67)  BP: 131/83 (28 Apr 2023 12:53) (119/71 - 168/86)  BP(mean): 102 (28 Apr 2023 12:53) (90 - 121)  ABP: --  ABP(mean): --  RR: 16 (28 Apr 2023 12:53) (16 - 16)  SpO2: 100% (28 Apr 2023 12:53) (95% - 100%)    O2 Parameters below as of 28 Apr 2023 12:53  Patient On (Oxygen Delivery Method): room air              04-27 @ 07:01  -  04-28 @ 07:00  --------------------------------------------------------  IN: 620 mL / OUT: 850 mL / NET: -230 mL    04-28 @ 07:01 - 04-28 @ 16:17  --------------------------------------------------------  IN: 0 mL / OUT: 450 mL / NET: -450 mL      CAPILLARY BLOOD GLUCOSE      POCT Blood Glucose.: 173 mg/dL (28 Apr 2023 16:13)      PHYSICAL EXAM:  GEN: Male in NAD on RA  HEENT: NC/AT, MMM  CV: RRR, nml S1S2, no murmurs  PULM: nml effort, CTAB  ABD: Soft, non-distended, NABS, non-tender  NEURO  A/O x3,   EOMI - there is a faint L sided droop present  5/5 in BUE and BLE  nml finger to nose - no dysmetria.   sensation intact  PSYCH: Appropriate    MEDICATIONS:  MEDICATIONS  (STANDING):  aspirin enteric coated 81 milliGRAM(s) Oral daily  atorvastatin 80 milliGRAM(s) Oral at bedtime  clopidogrel Tablet 75 milliGRAM(s) Oral daily  dextrose 5%. 1000 milliLiter(s) (50 mL/Hr) IV Continuous <Continuous>  dextrose 5%. 1000 milliLiter(s) (100 mL/Hr) IV Continuous <Continuous>  dextrose 50% Injectable 12.5 Gram(s) IV Push once  dextrose 50% Injectable 25 Gram(s) IV Push once  dextrose 50% Injectable 25 Gram(s) IV Push once  enoxaparin Injectable 40 milliGRAM(s) SubCutaneous every 24 hours  glucagon  Injectable 1 milliGRAM(s) IntraMuscular once  insulin lispro (ADMELOG) corrective regimen sliding scale   SubCutaneous Before meals and at bedtime    MEDICATIONS  (PRN):  dextrose Oral Gel 15 Gram(s) Oral once PRN Blood Glucose LESS THAN 70 milliGRAM(s)/deciliter      ALLERGIES:  Allergies    No Known Allergies    Intolerances        LABS:                        11.0   5.80  )-----------( 217      ( 28 Apr 2023 06:42 )             36.3     04-28    138  |  103  |  18  ----------------------------<  96  4.1   |  27  |  0.85    Ca    9.4      28 Apr 2023 06:42  Phos  4.1     04-28  Mg     1.9     04-28    TPro  7.2  /  Alb  3.7  /  TBili  0.2  /  DBili  x   /  AST  12  /  ALT  6<L>  /  AlkPhos  58  04-27          RADIOLOGY & ADDITIONAL TESTS: Reviewed.

## 2023-04-28 NOTE — DISCHARGE NOTE PROVIDER - CARE PROVIDERS DIRECT ADDRESSES
,DirectAddress_Unknown,[email or web address]

## 2023-04-28 NOTE — DISCHARGE NOTE PROVIDER - CARE PROVIDER_API CALL
Ene Hernandez)  Neurology  [address] [address]  Phone: [phone]  Fax: [phone]  Follow Up Time:     Levy Lagunas)  Cardiology; Interventional Cardiology  [address] [place] [address]  Phone: [phone]  Fax: [phone]  Follow Up Time:     Earnest Deleon  INTERNAL MEDICINE  [address] [address]  Phone: [phone]  Fax: [phone]  Follow Up Time:

## 2023-04-28 NOTE — PROGRESS NOTE ADULT - SUBJECTIVE AND OBJECTIVE BOX
----- Message from Casper Dobbins sent at 9/7/2018  2:07 PM CDT -----  Contact: Monica/[facility]  Monica called in regarding the attached patient and his Plan of Care that was faxed over around 8/28/18?  Monica stated it was to be signed & faxed back to [phone] & call back number is [phone]     Physical Medicine and Rehabilitation Progress Note :       Patient is a 47y old  Male who presents with a chief complaint of stroke (27 Apr 2023 15:23)      HPI:   **STROKE HPI***    HPI: 47y Male with PMHx of HTN, HLD, DM, CVA in 2022 with residual left sided deficits(not on aspirin), ?seizure history not on any meds was sent in from ambulatory office after a syncopal episode and confusion. History was obtained from chart review as the patient is not a good historian and emergency contact was unable to be reached. Reportedly his ex-wife picked him up around 10am this morning for an appointment and the patient was complaining of a headache. At the office the patient then had a syncopal episode and when he woke up he was confused. Unknown how long he was unresponsive. When EMS arrive the patient was hypertensive to the 200's (not on any BP meds at home). Stroke code was called on arrival to the ED. NIHSS 4. CTH w/ chronic infarcts left occipital, right basal ganglia and thalamic region. CTA w/ severe stenosis of the left P2, moderate to severe focal narrowing of the left superior M2, short segmental mild to moderate narrowing of the right inferior M2. CTP w/ elevated Tmax in the right frontal lobe that may be artifact. Patient out of the window for Tenecteplase and not a candidate for mechanical thrombectomy.   On reassessment patient was much more alert and oriented, able to answer more questions appropriately with mild confusion. Remembers that he had his prior stroke at [facility] but does not remember being told to take aspirin and only takes it intermittently when he has headaches.     PAST MEDICAL & SURGICAL HISTORY:  DM (diabetes mellitus)          FAMILY HISTORY:    T(C): 36.9 (04-25-23 @ 15:45), Max: 36.9 (04-25-23 @ 15:45)  HR: 65 (04-25-23 @ 15:53) (65 - 69)  BP: 203/103 (04-25-23 @ 15:53) (203/103 - 227/104)  RR: 16 (04-25-23 @ 15:53) (16 - 18)  SpO2: 98% (04-25-23 @ 15:53) (96% - 98%)    MEDICATION RECONCILIATION   MEDICATIONS  (STANDING):  atorvastatin 80 milliGRAM(s) Oral at bedtime    MEDICATIONS  (PRN):    Allergies    No Known Allergies    Intolerances    RADIOLOGY & ADDITIONAL STUDIES:    HCT:   < from: CT Brain Stroke Protocol (04.25.23 @ 14:32) >  Impression: No acute intracranial injury. Left occipital chronic   infarction. Right basal ganglia and thalamic chronic lacunar infarcts.      < from: CT Angio Brain Stroke Protocol  w/ IV Cont (04.25.23 @ 14:39) >  Impression:  Short segmental mild to moderate narrowing of the right inferior division   of the M2 segment of the middle cerebral artery.    Moderate to severe focal narrowing of the left superior division of the   M2 segment of the middle cerebral artery    Severe stenosis of the left P2 segment of the PCA.    Normal CTA of the extracranial circulation. No evidence of carotid   stenosis.    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 Recommend MRI   with contrast for further characterization.    < from: CT Brain Perfusion Maps Stroke (04.25.23 @ 14:39) >  IMPRESSION: Small volume reported elevated Tmax in the right frontal lobe   which may be artifactual. Otherwise no reported abnormal perfusion   metrics.         (25 Apr 2023 17:54)                            11.0   5.80  )-----------( 217      ( 28 Apr 2023 06:42 )             36.3       04-28    138  |  103  |  18  ----------------------------<  96  4.1   |  27  |  0.85    Ca    9.4      28 Apr 2023 06:42  Phos  4.1     04-28  Mg     1.9     04-28    TPro  7.2  /  Alb  3.7  /  TBili  0.2  /  DBili  x   /  AST  12  /  ALT  6<L>  /  AlkPhos  58  04-27    Vital Signs Last 24 Hrs  T(C): 37.1 (28 Apr 2023 08:00), Max: 37.3 (27 Apr 2023 21:43)  T(F): 98.7 (28 Apr 2023 08:00), Max: 99.1 (27 Apr 2023 21:43)  HR: 52 (28 Apr 2023 12:01) (52 - 67)  BP: 119/71 (28 Apr 2023 12:01) (119/71 - 169/86)  BP(mean): 90 (28 Apr 2023 12:01) (90 - 121)  RR: 16 (28 Apr 2023 12:01) (16 - 18)  SpO2: 95% (28 Apr 2023 12:01) (95% - 100%)    Parameters below as of 28 Apr 2023 12:01  Patient On (Oxygen Delivery Method): room air        MEDICATIONS  (STANDING):  aspirin enteric coated 81 milliGRAM(s) Oral daily  atorvastatin 80 milliGRAM(s) Oral at bedtime  clopidogrel Tablet 75 milliGRAM(s) Oral daily  dextrose 5%. 1000 milliLiter(s) (50 mL/Hr) IV Continuous <Continuous>  dextrose 5%. 1000 milliLiter(s) (100 mL/Hr) IV Continuous <Continuous>  dextrose 50% Injectable 12.5 Gram(s) IV Push once  dextrose 50% Injectable 25 Gram(s) IV Push once  dextrose 50% Injectable 25 Gram(s) IV Push once  enoxaparin Injectable 40 milliGRAM(s) SubCutaneous every 24 hours  glucagon  Injectable 1 milliGRAM(s) IntraMuscular once  insulin lispro (ADMELOG) corrective regimen sliding scale   SubCutaneous Before meals and at bedtime  lisinopril 10 milliGRAM(s) Oral once    MEDICATIONS  (PRN):  dextrose Oral Gel 15 Gram(s) Oral once PRN Blood Glucose LESS THAN 70 milliGRAM(s)/deciliter        Initial Functional Status Assessment :       Previous Level of Function:     · Ambulation Skills	independent  · Transfer Skills	independent  · ADL Skills	independent  · Work/Leisure Activity	independent  · Additional Comments	1 fall last week, no others in past year, apartment, no steps, no use of AD    Cognitive Status Examination:   · Orientation	oriented to person, place, time and situation  · Level of Consciousness	alert  · Follows Commands and Answers Questions	100% of the time; unable to follow multi-step instructions  · Personal Safety and Judgment	intact  · Short Term Memory	intact  · Long Term Memory	intact    Range of Motion Exam:   · Range of Motion Examination	no ROM deficits were identified  · Active Range of Motion Examination	no Active ROM deficits were identified    Manual Muscle Testing:   · Manual Muscle Testing Results	no strength deficits were identified  BLE 5/5 MMT except L hip flexion 4/5 MMT    Bed Mobility: Rolling/Turning:     · Level of [place]	supervision    Bed Mobility: Scooting/Bridging:     · Level of [place]	supervision    Bed Mobility: Sit to Supine:     · Level of [place]	supervision    Bed Mobility: Supine to Sit:     · Level of [place]	supervision    Transfer: Sit to Stand:     · Level of [place]	contact guard    Transfer: Stand to Sit:     · Level of [place]	contact guard    Gait Skills:     · Level of [place]	contact guard  · Gait Distance	100 feet    Gait Analysis:     · Gait Deviations Noted	slight L neglect, steady, good too, no LOB  · Impairments Contributing to Gait Deviations	decreased strength    Balance Skills Assessment:     · Sitting Balance: Static	good balance  · Sitting Balance: Dynamic	good balance  · Sit-to-Stand Balance	fair minus  · Standing Balance: Static	fair minus  · Standing Balance: Dynamic	fair minus  · Systems Impairment Contributing to Balance Disturbance	musculoskeletal  · Identified Impairments Contributing to Balance Disturbance	decreased strength    Sensory Examination:   Sensory Examination:    Grossly Intact:   · Gross Sensory Examination	Grossly Intact; Left UE; Right UE; Left LE; Right LE; Head/Neck      Light Touch Sensation:   · Left UE	within normal limits   · Right UE	within normal limits   · Left LE	within normal limits   · Right LE	within normal limits   · Head/Neck	within normal limits     · Coordination Assessed	finger to nose      Proprioception:   · Coordination Assessed	finger to nose      Fine Motor Coordination:   Fine Motor Coordination Examination:    Grossly Intact:   · Grossly Intact	Right UE      Fine Motor Coordination:   · Left Hand, Finger to Nose	mild impairment   · Right Hand, Finger to Nose	normal performance   · Left Hand Thumb/Finger Opposition Skills	normal performance   · Right Hand Thumb/Finger Opposition Skills	normal performance   · Left Hand, Diadochokinesis Skills	mild impairment   · Right Hand, Diadochokinesis Skills	normal performance     Treatment Location:   · Comments	Facial nerve: slightly drooped L smile; Visual quadrant impaired L lower temporal; Visual tracking all directions intact    Clinical Impressions:   · Criteria for Skilled Therapeutic Interventions	impairments found; functional limitations in following categories; risk reduction/prevention; rehab potential; therapy frequency; anticipated discharge recommendation  · Impairments Found (describe specific impairments)	aerobic capacity/endurance; gait, locomotion, and balance; muscle strength  · Functional Limitations in Following Categories (describe specific limitations)	self-care; home management; community/leisure  · Risk Reduction/Prevention (Describe Specific Areas of risk reduction/prevention)	risk factors  · Risk Areas	fall        PM&R Impression : as above    Current Disposition Plan Recommendations :     d/c home, no rehab needs

## 2023-04-28 NOTE — DISCHARGE NOTE PROVIDER - NSDCFUADDAPPT_GEN_ALL_CORE_FT
Please follow up with your PCP within 1-2 weeks.    Stroke team will call you with a follow up appointment.

## 2023-04-28 NOTE — DISCHARGE NOTE PROVIDER - NSDCCPCAREPLAN_GEN_ALL_CORE_FT
PRINCIPAL DISCHARGE DIAGNOSIS  Diagnosis: Stroke  Assessment and Plan of Treatment:   Stroke  WHAT YOU NEED TO KNOW:  A stroke happens when blood flow to part of the brain is stopped. This can cause serious brain damage from a lack of oxygen. Your healthcare providers will help you create goals for your recovery. They will help you and your family or care providers make a plan for care at home to help you reach your goals. The plan will include lifestyle changes you can make to help you manage your health and prevent another stroke. Your discharge instructions will include information on services and equipment you or your family may need.  Ischemic Stroke  Hemorrhagic Stroke  DISCHARGE INSTRUCTIONS:  Call your local emergency number (911 in the [place]) or have someone call if:  You have any of the following signs of a stroke:  Numbness or drooping on one side of your face  Weakness in an arm or leg  Confusion or difficulty speaking  Dizziness, a severe headache, or vision loss  BE FAST SIGNS OF A STROKE  You have a seizure.  You have chest pain or shortness of breath.  Seek care immediately if:  Your arm or leg feels warm, tender, and painful. It may look swollen and red.  You have loss of balance or coordination.  You have double vision or vision loss.  You have unusual or heavy bleeding.  Call your doctor or neurologist if:  Your blood sugar level or blood pressure is higher or lower than usual.  You have trouble swallowing.  You have trouble having a bowel movement or urinating.  You have questions or concerns about your condition or care.  Medicines: Medicines may be needed to treat high cholesterol, high blood pressure, or diabetes. You may also need any of the following, depending on the kind of stroke you had:  Antiplatelets, such as aspirin, help prevent blood clots. Take your antiplatelet medicine exactly as directed. These medicines make it more likely for you to bleed or bruise. If you are told to take aspirin, do not take acetaminophen or ibuprofen instead.  Blood thinners help prevent blood clots. Clots can cause strokes, heart attacks, and death.

## 2023-04-28 NOTE — DISCHARGE NOTE PROVIDER - NSDCMRMEDTOKEN_GEN_ALL_CORE_FT
atorvastatin 40 mg oral tablet: 1 orally once a day (at bedtime)  MetFORMIN (Eqv-Glucophage XR) 500 mg oral tablet, extended release: 1 orally once a day  rosuvastatin 10 mg oral tablet: 1 orally once a day  Vitamin D3 50 mcg (2000 intl units) oral capsule: 1 orally once a day   aspirin 81 mg oral delayed release tablet: 1 tab(s) orally once a day  atorvastatin 80 mg oral tablet: 1 tab(s) orally once a day (at bedtime)  clopidogrel 75 mg oral tablet: 1 tab(s) orally once a day  lisinopril 20 mg oral tablet: 1 tab(s) orally once a day  MetFORMIN (Eqv-Glucophage XR) 500 mg oral tablet, extended release: 1 tablet, extended release orally once a day  Vitamin D3 50 mcg (2000 intl units) oral capsule: 1 orally once a day

## 2023-04-28 NOTE — DISCHARGE NOTE NURSING/CASE MANAGEMENT/SOCIAL WORK - NSDCPEFALRISK_GEN_ALL_CORE
For information on Fall & Injury Prevention, visit: [email or web address] OR  [email or web address] OR  https://www.cdc.gov/steadi/patient.html

## 2023-04-28 NOTE — PROGRESS NOTE ADULT - ASSESSMENT
Neurology    47 y o Male with PMHx of HTN, HLD, DM, CVA in 2022 with residual left sided deficits(not on aspirin), prior seizure in 2022 (not on any meds) presented to [facility] ED from ambulatory office after a sudden onset headache and syncopal episode with confusion. History in ED was obtained from chart review as the patient is not a good historian and emergency contact was unable to be reached. Stroke code called on arrival to ED. NIHSS 4. CTH w/ chronic infarcts left occipital, right basal ganglia and thalamic region. CTA w/ severe stenosis of the left P2, moderate to severe focal narrowing of the left superior M2, short segmental mild to moderate narrowing of the right inferior M2. 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 CTP w/ elevated Tmax in the right frontal lobe that may be artifact. Patient out of the window for Tenecteplase and not a candidate for mechanical thrombectomy. Pt started on aspirin and atorvastatin and admitted to stroke service for further workup with MRI with contrast, TTE and EEG.    Neuro  #CVA vs. Hypertensive urgency   - s/p 325mg aspirin in the field  - continue aspirin 81mg, added 75mg Plavix QD  - continue atorvastatin 80mg daily  - q4hr stroke neuro checks and vitals  - MRI brain with and without contrast: small recent infarcts in the right thalamus and   brainstem and pontomesencephalic junction.  - Stroke Code HCT Results: chronic infarcts left occipital, right basal ganglia and thalamic region  - Stroke Code CTA Results: severe stenosis of the left P2, moderate to severe focal narrowing of the left superior M2, short segmental mild to moderate narrowing of the right inferior M2  - Stroke education  - EEG to r/o seizures- per outside documentation patient has history of seizures, not on any meds - negative  - hypercoag labs ordered, antithrombin III 75, will need to be rechecked outpatient    Cards  #HTN  - Goal -180  - Patient hypertensive in ED, hydralazine PRN to maintain SBP < 180  - started lisinopril 10mg QD  - NPO at [facility] for MATT, EP consulted 4/27 for ILR  - obtain TTE with bubble  - Stroke Code EKG Results: NSR    #HLD  - high dose statin as above in CVA  - LDL results: 89  - Home medication: rosuvastatin 10mg      Pulm  - call provider if SPO2 < 94%    GI  #Nutrition/Fluids/Electrolytes   - replete K<4 and Mg <2  - Diet: Consistent Carb/DASH  - IVF: none for now    Renal  - LIZ    Infectious Disease  - Stroke Code CXR results: No acute pathology    Endocrine  #DM  - A1C results: 7.0  - Random BS: 254 on 4/26  - Moderate ISS  - home medication: metformin 500mg once daily     - TSH results: 2.240    DVT Prophylaxis  - lovenox sq for DVT prophylaxis   - SCDs for DVT prophylaxis

## 2023-04-28 NOTE — DISCHARGE NOTE NURSING/CASE MANAGEMENT/SOCIAL WORK - PATIENT PORTAL LINK FT
You can access the FollowMyHealth Patient Portal offered by [facility] by registering at the following website: [email or web address]. By joining [facility]’s FollowMyHealth portal, you will also be able to view your health information using other applications (apps) compatible with our system.

## 2023-04-28 NOTE — DISCHARGE NOTE PROVIDER - HOSPITAL COURSE
Hospital course:  47-year-old gentleman with a history of hypertension, hyperlipidemia, type 2 diabetes, prior stroke with ?residual left-sided weakness (not on aspirin, details unclear), and possible seizure around the time of his stroke who was admitted after presenting with sudden-onset, severe headache associated with dizziness and syncope/loss of consciousness followed by a period of confusion with gradual improvement in setting of 's. Initial imaging was unrevealing for acute findings but did show evidence of prior chronic strokes as well as scattered ICAD of moderate to severe severity. MRI with + multiple small acute infarcts of the posterior circulation. For now, patient will remain on DAPT. Hypercoag panel sent, anthrombin III resulted at 75 (low). Plan to redraw as an outpatient at follow up. ILR placed.   Echo with bubble performed, EF 60%, no PFO. MATT significant for dilated aortic root of 4.4cm and 4cm dilitation of proximal ascending aorta. CT surgery was consulted and they will continue to follow with him outpatient.     During this hospital course, patient had ischemic strokes located in right thalamus and brainstem and pontomesencephalic junction as seen on MRI.    The stroke etiology is likely secondary to:  []atrial fibrillation  []small vessel disease from atherosclerotic risk factors  []other:  [x]etiology workup still in progress    Patient had the following workup done in house:  CT Head: No acute intracranial injury. Left occipital chronic   infarction. Right basal ganglia and thalamic chronic lacunar infarcts.    MR Head with and without contrast: DWI positive for small recent infarcts in the right thalamus and   brainstem and pontomesencephalic junction. 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  No intracranial mass or abnormal enhancement.  Right suboccipital region subcutaneous mass is fat signal with septated   and locular appearance consistent with a lipoma; no enhancing nodules to   imply liposarcoma though please notethis is NOT a diagnosis excluded   made on imaging. Any growing fatty mass would be of greater concern.   Clinical exam follow-up advised.    CT Angio Head: Short segmental mild to moderate narrowing of the right inferior division   of the M2 segment of the middle cerebral artery.    Moderate to severe focal narrowing of the left superior division of the   M2 segment of the middle cerebral artery    Severe stenosis of the left P2 segment of the PCA.    Normal CTA of the extracranial circulation. No evidence of carotid   stenosis.    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 Recommend MRI   with contrast for further characterization.    [x]echo -  1. Normal left and right ventricular size and systolic function.   2. Mild symmetric left ventricular hypertrophy.   3. Mild tricuspid regurgitation.   4. Pulmonary artery systolic pressure is 31 mmHg.   5. No pericardial effusion.   6. Mildly dilated aortic root.    [x] MATT -  1. Normal left ventricular systolic function.   2. Normal right ventricular systolic function.   3. No LA/RA/LAURA/RAA thrombus seen.   4. No evidence of an intracardiac shunt.   5. No significant valvular disease.   6. No evidence of pulmonary hypertension.   7. No pericardial effusion.   8. Aortic root is dilated measuring 4.40 cm. Proximal ascending aorta is   mildly dilated measuring 4.00 cm.    [x]labs - anthrombin III-75, LDL - 89, TSH - 2.240, A1C - 7.0%  []other    Physical exam at discharge:  Neurologic:  -Mental status: Awake, alert, oriented to person, place, and time. Speech is slow but fluent with intact naming, repetition, no dysarthria. Follows simple commands.. Attention/concentration intact.   -Cranial nerves:   II: Visual fields intact, appears to have some difficulty identifying examiners fingers in right upper and lower quadrants of right eye.   III, IV, VI: Extraocular movements are intact without nystagmus. Pupils equally round and reactive to light  V:  Facial sensation V1-V3 equal and intact   VII: Face is symmetric with normal eye closure and smile  Motor: Normal bulk and tone. No pronator drift. RUE 5/5, LUE 4/5 with mild drift, bilateral lower extremities 5/5.  Sensation: Intact to light touch bilaterally. No neglect or extinction on double simultaneous testing.  Coordination: No dysmetria on R finger-to-nose; some dysmetria noted on with L hand possibly due secondary to LUE weakness  NIHSS: 2    New medications on discharge: Aspirin 81mg daily, Plavix 75mg daily, Atorvastatin 80mg daily, Lisinopril 20mg daily  Labs to be followed up: hypercoag studies  Imaging to be done as outpatient:  Further outpatient workup: Resend hypercoag panel at outpatient follow up   Hospital course:  47-year-old gentleman with a history of hypertension, hyperlipidemia, type 2 diabetes, prior stroke with ?residual left-sided weakness (not on aspirin, details unclear), and possible seizure around the time of his stroke who was admitted after presenting with sudden-onset, severe headache associated with dizziness and syncope/loss of consciousness followed by a period of confusion with gradual improvement in setting of 's. Initial imaging was unrevealing for acute findings but did show evidence of prior chronic strokes as well as scattered ICAD of moderate to severe severity. MRI with + multiple small acute infarcts of the posterior circulation. For now, patient will remain on DAPT. Hypercoag panel sent, antithrombin III resulted at 75 (low). Plan to redraw as an outpatient at follow up. ILR placed.   Echo with bubble performed, EF 60%, no PFO. MATT significant for dilated aortic root of 4.4cm and 4cm dilatation of proximal ascending aorta. CT surgery was consulted and they will continue to follow with him outpatient.     During this hospital course, patient had ischemic strokes located in right thalamus and brainstem and pontomesencephalic junction as seen on MRI.    The stroke etiology is likely secondary to:  []atrial fibrillation  []small vessel disease from atherosclerotic risk factors  []other:  [x]etiology workup still in progress    Patient had the following workup done in house:  CT Head: No acute intracranial injury. Left occipital chronic   infarction. Right basal ganglia and thalamic chronic lacunar infarcts.    MR Head with and without contrast: DWI positive for small recent infarcts in the right thalamus and   brainstem and pontomesencephalic junction. 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  No intracranial mass or abnormal enhancement.  Right suboccipital region subcutaneous mass is fat signal with septated   and locular appearance consistent with a lipoma; no enhancing nodules to   imply liposarcoma though please notethis is NOT a diagnosis excluded   made on imaging. Any growing fatty mass would be of greater concern.   Clinical exam follow-up advised.    CT Angio Head: Short segmental mild to moderate narrowing of the right inferior division   of the M2 segment of the middle cerebral artery.    Moderate to severe focal narrowing of the left superior division of the   M2 segment of the middle cerebral artery    Severe stenosis of the left P2 segment of the PCA.    Normal CTA of the extracranial circulation. No evidence of carotid   stenosis.    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 Recommend MRI   with contrast for further characterization.    [x]echo -  1. Normal left and right ventricular size and systolic function.   2. Mild symmetric left ventricular hypertrophy.   3. Mild tricuspid regurgitation.   4. Pulmonary artery systolic pressure is 31 mmHg.   5. No pericardial effusion.   6. Mildly dilated aortic root.    [x] MATT -  1. Normal left ventricular systolic function.   2. Normal right ventricular systolic function.   3. No LA/RA/LAURA/RAA thrombus seen.   4. No evidence of an intracardiac shunt.   5. No significant valvular disease.   6. No evidence of pulmonary hypertension.   7. No pericardial effusion.   8. Aortic root is dilated measuring 4.40 cm. Proximal ascending aorta is   mildly dilated measuring 4.00 cm.    [x]labs - anthrombin III-75, LDL - 89, TSH - 2.240, A1C - 7.0%  []other    Physical exam at discharge:  Neurologic:  -Mental status: Awake, alert, oriented to person, place, and time. Speech is slow but fluent with intact naming, repetition, no dysarthria. Follows simple commands.. Attention/concentration intact.   -Cranial nerves:   II: Visual fields intact, appears to have some difficulty identifying examiners fingers in right upper and lower quadrants of right eye.   III, IV, VI: Extraocular movements are intact without nystagmus. Pupils equally round and reactive to light  V:  Facial sensation V1-V3 equal and intact   VII: Face is symmetric with normal eye closure and smile  Motor: Normal bulk and tone. No pronator drift. RUE 5/5, LUE 4/5 with mild drift, bilateral lower extremities 5/5.  Sensation: Intact to light touch bilaterally. No neglect or extinction on double simultaneous testing.  Coordination: No dysmetria on R finger-to-nose; some dysmetria noted on with L hand possibly due secondary to LUE weakness  NIHSS: 2    New medications on discharge: Aspirin 81mg daily, Plavix 75mg daily, Atorvastatin 80mg daily, Lisinopril 20mg daily  Labs to be followed up: hypercoag studies  Imaging to be done as outpatient:  Further outpatient workup: Resend hypercoag panel at outpatient follow up   Hospital course:  47-year-old gentleman with a history of hypertension, hyperlipidemia, type 2 diabetes, prior stroke with ?residual left-sided weakness (not on aspirin, details unclear), and possible seizure around the time of his stroke who was admitted after presenting with sudden-onset, severe headache associated with dizziness and syncope/loss of consciousness followed by a period of confusion with gradual improvement in setting of 's. Initial imaging was unrevealing for acute findings but did show evidence of prior chronic strokes as well as scattered ICAD of moderate to severe severity. MRI with + multiple small acute infarcts of the posterior circulation. For now, patient will remain on DAPT. Hypercoag panel sent, antithrombin III resulted at 75 (low). Plan to redraw as an outpatient at follow up. ILR placed.   Echo with bubble performed, EF 60%, no PFO. MATT significant for dilated aortic root of 4.4cm and 4cm dilatation of proximal ascending aorta. CT surgery was consulted and they will continue to follow with him outpatient.     Plan to continue patient on DAPT for 3 months and check accumetrics at follow up for aspirin sensitivity.     During this hospital course, patient had ischemic strokes located in right thalamus and brainstem and pontomesencephalic junction as seen on MRI.    The stroke etiology is likely secondary to:  []atrial fibrillation  []small vessel disease from atherosclerotic risk factors  []other:  [x]etiology workup still in progress    Patient had the following workup done in house:  CT Head: No acute intracranial injury. Left occipital chronic   infarction. Right basal ganglia and thalamic chronic lacunar infarcts.    MR Head with and without contrast: DWI positive for small recent infarcts in the right thalamus and   brainstem and pontomesencephalic junction. 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  No intracranial mass or abnormal enhancement.  Right suboccipital region subcutaneous mass is fat signal with septated   and locular appearance consistent with a lipoma; no enhancing nodules to   imply liposarcoma though please notethis is NOT a diagnosis excluded   made on imaging. Any growing fatty mass would be of greater concern.   Clinical exam follow-up advised.    CT Angio Head: Short segmental mild to moderate narrowing of the right inferior division   of the M2 segment of the middle cerebral artery.    Moderate to severe focal narrowing of the left superior division of the   M2 segment of the middle cerebral artery    Severe stenosis of the left P2 segment of the PCA.    Normal CTA of the extracranial circulation. No evidence of carotid   stenosis.    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 Recommend MRI   with contrast for further characterization.    [x]echo -  1. Normal left and right ventricular size and systolic function.   2. Mild symmetric left ventricular hypertrophy.   3. Mild tricuspid regurgitation.   4. Pulmonary artery systolic pressure is 31 mmHg.   5. No pericardial effusion.   6. Mildly dilated aortic root.    [x] MATT -  1. Normal left ventricular systolic function.   2. Normal right ventricular systolic function.   3. No LA/RA/LAURA/RAA thrombus seen.   4. No evidence of an intracardiac shunt.   5. No significant valvular disease.   6. No evidence of pulmonary hypertension.   7. No pericardial effusion.   8. Aortic root is dilated measuring 4.40 cm. Proximal ascending aorta is   mildly dilated measuring 4.00 cm.    [x]labs - anthrombin III-75, LDL - 89, TSH - 2.240, A1C - 7.0%  []other    Physical exam at discharge:  Neurologic:  -Mental status: Awake, alert, oriented to person, place, and time. Speech is slow but fluent with intact naming, repetition, no dysarthria. Follows simple commands.. Attention/concentration intact.   -Cranial nerves:   II: Visual fields intact, appears to have some difficulty identifying examiners fingers in right upper and lower quadrants of right eye.   III, IV, VI: Extraocular movements are intact without nystagmus. Pupils equally round and reactive to light  V:  Facial sensation V1-V3 equal and intact   VII: Face is symmetric with normal eye closure and smile  Motor: Normal bulk and tone. No pronator drift. RUE 5/5, LUE 4/5 with mild drift, bilateral lower extremities 5/5.  Sensation: Intact to light touch bilaterally. No neglect or extinction on double simultaneous testing.  Coordination: No dysmetria on R finger-to-nose; some dysmetria noted on with L hand possibly due secondary to LUE weakness  NIHSS: 2    New medications on discharge: Aspirin 81mg daily, Plavix 75mg daily, Atorvastatin 80mg daily, Lisinopril 20mg daily  Labs to be followed up: hypercoag studies  Imaging to be done as outpatient:  Further outpatient workup: Resend hypercoag panel at outpatient follow up

## 2023-04-28 NOTE — PROGRESS NOTE ADULT - ASSESSMENT
PCP: Earnest Deleon. Pharmacy - [facility] in the [place]  47M w CVA in 2022 w residual L sided deficits, HTN, HLD, DM2, p/w syncopal episode and confusion in outpatient office, SBP 200s, NIHSS 4, found to have chronic L occipital, R BG and Thalamic infarction, CTA w severe L P2 stenosis, mod-severe narrowing of L Superior M2, admitted for CVA/TIA work-up, MR shows recent infarction in R thalamus and chronic basal ganglia stroke, etiology still unclear    #CVA/TIA evaluation - MR shows recent infarction in R thalamus and chronic basal ganglia stroke  #h/o CVA  A1C 7. LDL 89. TSH 2.24  on ASA    #Encephalopathy - UDS positive for THC  #HTN -  -160 in AM. BP target per stroke   - started on lisinopril 10mg daily  #HLD - on atorva 40  #DM2 - A1C 7. Home on metformin  #Microcytic anemia - 11 from 10.8 from 11 today.  #R suboccipital lipomatous lesion - 5.4cm seen on CTA head and MRI  #Aortic root dilitation - 4.4cm - to f/u CT Surgery for surveillance    Recommend  Overall nml iron studies. f/u CBC w diff as outpatient and consider hemoglobin electrophoresis and age appropriate CA screening - colonoscopy if anemia persists  Agree w lisinopril increase to 20mg daily    DISPO: PT recommend home no needs

## 2023-04-28 NOTE — DISCHARGE NOTE PROVIDER - NSDCFUSCHEDAPPT_GEN_ALL_CORE_FT
Shazia Ruggiero  [facility] Physician [facility]  [facility] [address]  Scheduled Appointment: 06/05/2023

## 2023-04-29 LAB
AT III AG PPP IA-MCNC: 24 MG/DL — SIGNIFICANT CHANGE UP (ref 19–31)
PROT S FREE PPP-ACNC: 70 % — SIGNIFICANT CHANGE UP (ref 63–140)

## 2023-05-05 DIAGNOSIS — I36.9 NONRHEUMATIC TRICUSPID VALVE DISORDER, UNSPECIFIED: ICD-10-CM

## 2023-05-05 DIAGNOSIS — I69.354 HEMIPLEGIA AND HEMIPARESIS FOLLOWING CEREBRAL INFARCTION AFFECTING LEFT NON-DOMINANT SIDE: ICD-10-CM

## 2023-05-05 DIAGNOSIS — E78.5 HYPERLIPIDEMIA, UNSPECIFIED: ICD-10-CM

## 2023-05-05 DIAGNOSIS — R29.810 FACIAL WEAKNESS: ICD-10-CM

## 2023-05-05 DIAGNOSIS — D50.9 IRON DEFICIENCY ANEMIA, UNSPECIFIED: ICD-10-CM

## 2023-05-05 DIAGNOSIS — F17.210 NICOTINE DEPENDENCE, CIGARETTES, UNCOMPLICATED: ICD-10-CM

## 2023-05-05 DIAGNOSIS — I67.2 CEREBRAL ATHEROSCLEROSIS: ICD-10-CM

## 2023-05-05 DIAGNOSIS — R29.704 NIHSS SCORE 4: ICD-10-CM

## 2023-05-05 DIAGNOSIS — I63.9 CEREBRAL INFARCTION, UNSPECIFIED: ICD-10-CM

## 2023-05-05 DIAGNOSIS — R47.01 APHASIA: ICD-10-CM

## 2023-05-05 DIAGNOSIS — D17.0 BENIGN LIPOMATOUS NEOPLASM OF SKIN AND SUBCUTANEOUS TISSUE OF HEAD, FACE AND NECK: ICD-10-CM

## 2023-05-05 DIAGNOSIS — I77.810 THORACIC AORTIC ECTASIA: ICD-10-CM

## 2023-05-05 DIAGNOSIS — Z79.84 LONG TERM (CURRENT) USE OF ORAL HYPOGLYCEMIC DRUGS: ICD-10-CM

## 2023-05-05 DIAGNOSIS — I10 ESSENTIAL (PRIMARY) HYPERTENSION: ICD-10-CM

## 2023-05-05 DIAGNOSIS — E11.9 TYPE 2 DIABETES MELLITUS WITHOUT COMPLICATIONS: ICD-10-CM

## 2023-05-05 DIAGNOSIS — F12.99 CANNABIS USE, UNSPECIFIED WITH UNSPECIFIED CANNABIS-INDUCED DISORDER: ICD-10-CM

## 2023-05-05 DIAGNOSIS — I16.0 HYPERTENSIVE URGENCY: ICD-10-CM

## 2023-05-05 DIAGNOSIS — G93.40 ENCEPHALOPATHY, UNSPECIFIED: ICD-10-CM

## 2023-05-15 NOTE — STROKE CODE NOTE - IV ALTEPLASE EXCL ABS HIDDEN
Video Education Report - ICR/CR  Name:  Camilla Ramey     Date:  5/15/2023  MRN: [id number]     Session #:    Session Length: 40 min    Exercise      Healthy Mind-Set  []Improving Performance    []Smoking Cessation    [x]Introduction to [address]  []Aging-Enhancing the Quality of Your Life  []Becoming a Pritikin   []Biology of Weight Control    []Cooking Breakfasts   []Decoding Lab Results    and Snacks  []Diseases of Our Time - Overview   []Cooking Dinner and   []Sleep Disorders     Sides  []Targeting Your Nutrition Priorities   []Healthy Salads &   Dressings  Nutrition      []Cooking Soups and   []Dining Out - Part 2     Desserts  []Fueling a Healthy Body   []Menu Workshop     Overview  []Planning Your Eating Strategy   []The Pritikin Solution  []Vitamins and Minerals    Comments:  Video completed, group discussion
show

## 2023-06-01 ENCOUNTER — APPOINTMENT (OUTPATIENT)
Dept: HEART AND VASCULAR | Facility: CLINIC | Age: 48
End: 2023-06-01

## 2023-06-05 ENCOUNTER — APPOINTMENT (OUTPATIENT)
Dept: HEART AND VASCULAR | Facility: CLINIC | Age: 48
End: 2023-06-05

## 2023-07-06 ENCOUNTER — NON-APPOINTMENT (OUTPATIENT)
Age: 48
End: 2023-07-06

## 2023-07-06 ENCOUNTER — APPOINTMENT (OUTPATIENT)
Dept: HEART AND VASCULAR | Facility: CLINIC | Age: 48
End: 2023-07-06
Payer: MEDICAID

## 2023-07-06 PROCEDURE — 93298 REM INTERROG DEV EVAL SCRMS: CPT

## 2023-07-06 PROCEDURE — G2066: CPT

## 2023-07-26 NOTE — ED ADULT TRIAGE NOTE - ARRIVAL FROM
Doctor's office
your pmd in 1-3 days,   Phone: (   )    -  Fax: (   )    -  Follow Up Time:     Lei Swenson  Neurology  [address] [place] [address], [address]  [place] [address]  Phone: [phone]  Fax: [phone]  Follow Up Time: 1-3 Days

## 2023-08-08 ENCOUNTER — APPOINTMENT (OUTPATIENT)
Dept: HEART AND VASCULAR | Facility: CLINIC | Age: 48
End: 2023-08-08
Payer: SELF-PAY

## 2023-08-08 ENCOUNTER — NON-APPOINTMENT (OUTPATIENT)
Age: 48
End: 2023-08-08

## 2023-08-08 PROCEDURE — G2066: CPT

## 2023-08-08 PROCEDURE — 93298 REM INTERROG DEV EVAL SCRMS: CPT

## 2023-08-09 NOTE — PATIENT PROFILE ADULT - FUNCTIONAL ASSESSMENT - BASIC MOBILITY ASSESSMENT TYPE
Destiny Moser M.D.    Patient is a 61y old  Male who presents with a chief complaint of CVA with R sided weakness (08 Aug 2023 10:02)      SUBJECTIVE / OVERNIGHT EVENTS: Still with right sided rib pain.     Patient denies SOB, abd pain, N/V, fever, chills, dysuria or any other complaints. All remainder ROS negative.     MEDICATIONS  (STANDING):  atorvastatin 80 milliGRAM(s) Oral at bedtime  folic acid 1 milliGRAM(s) Oral daily  gabapentin 300 milliGRAM(s) Oral <User Schedule>  lidocaine   4% Patch 1 Patch Transdermal daily  melatonin 3 milliGRAM(s) Oral <User Schedule>  multivitamin 1 Tablet(s) Oral daily  pantoprazole   Suspension 40 milliGRAM(s) Oral before breakfast  potassium chloride  10 mEq/100 mL IVPB 10 milliEquivalent(s) IV Intermittent every 1 hour    MEDICATIONS  (PRN):  acetaminophen     Tablet .. 650 milliGRAM(s) Oral every 6 hours PRN Temp greater or equal to 38C (100.4F), Mild Pain (1 - 3)  aluminum hydroxide/magnesium hydroxide/simethicone Suspension 30 milliLiter(s) Oral every 4 hours PRN Dyspepsia  hydrALAZINE Injectable 10 milliGRAM(s) IV Push every 4 hours PRN SBP >160  hydrALAZINE Injectable 5 milliGRAM(s) IV Push every 4 hours PRN SBP >140  labetalol Injectable 10 milliGRAM(s) IV Push every 10 minutes PRN SBP> 160 and DBP> 90  ondansetron Injectable 4 milliGRAM(s) IV Push every 8 hours PRN Nausea and/or Vomiting  oxyCODONE    IR 5 milliGRAM(s) Oral four times a day PRN Moderate Pain (4 - 6) and Severe Pain (7-10)  senna 2 Tablet(s) Oral at bedtime PRN Constipation      I&O's Summary      PHYSICAL EXAM:  Vital Signs Last 24 Hrs  T(C): 36.7 (09 Aug 2023 08:15), Max: 37.2 (09 Aug 2023 00:20)  T(F): 98 (09 Aug 2023 08:15), Max: 98.9 (09 Aug 2023 00:20)  HR: 60 (09 Aug 2023 08:15) (56 - 66)  BP: 150/80 (09 Aug 2023 08:15) (115/65 - 150/82)  BP(mean): --  RR: 18 (09 Aug 2023 08:15) (16 - 18)  SpO2: 97% (09 Aug 2023 08:15) (95% - 98%)    Parameters below as of 09 Aug 2023 08:15  Patient On (Oxygen Delivery Method): room air      CONSTITUTIONAL: NAD, well-groomed  ENMT: Moist oral mucosa, no pharyngeal injection or exudates; normal dentition  RESPIRATORY: Normal respiratory effort; lungs are clear to auscultation bilaterally  CARDIOVASCULAR: Regular rate and rhythm; No lower extremity edema. TTP in the right rib area  ABDOMEN: Nontender to palpation, normoactive bowel sounds  PSYCH: A+O x3; affect appropriate  NEUROLOGY: CN 2-12 are intact and symmetric; no gross sensory deficits;   SKIN: No rashes; no palpable lesions    LABS:                        14.4   12.99 )-----------( 291      ( 09 Aug 2023 05:00 )             41.1     08-09    139  |  101  |  8.1  ----------------------------<  99  3.4<L>   |  23.0  |  0.62    Ca    9.0      09 Aug 2023 05:00        Urinalysis Basic - ( 09 Aug 2023 05:00 )    Color: x / Appearance: x / SG: x / pH: x  Gluc: 99 mg/dL / Ketone: x  / Bili: x / Urobili: x   Blood: x / Protein: x / Nitrite: x   Leuk Esterase: x / RBC: x / WBC x   Sq Epi: x / Non Sq Epi: x / Bacteria: x        CAPILLARY BLOOD GLUCOSE          RADIOLOGY & ADDITIONAL TESTS:  Results Reviewed:   Imaging Personally Reviewed:  Electrocardiogram Personally Reviewed: Admission

## 2023-09-12 ENCOUNTER — APPOINTMENT (OUTPATIENT)
Dept: HEART AND VASCULAR | Facility: CLINIC | Age: 48
End: 2023-09-12
Payer: SELF-PAY

## 2023-09-12 ENCOUNTER — NON-APPOINTMENT (OUTPATIENT)
Age: 48
End: 2023-09-12

## 2023-09-12 PROCEDURE — G2066: CPT

## 2023-09-12 PROCEDURE — 93298 REM INTERROG DEV EVAL SCRMS: CPT

## 2023-10-12 ENCOUNTER — APPOINTMENT (OUTPATIENT)
Dept: HEART AND VASCULAR | Facility: CLINIC | Age: 48
End: 2023-10-12

## 2023-11-08 ENCOUNTER — APPOINTMENT (OUTPATIENT)
Dept: HEART AND VASCULAR | Facility: CLINIC | Age: 48
End: 2023-11-08

## 2023-12-15 ENCOUNTER — APPOINTMENT (OUTPATIENT)
Dept: HEART AND VASCULAR | Facility: CLINIC | Age: 48
End: 2023-12-15

## 2024-04-17 ENCOUNTER — APPOINTMENT (OUTPATIENT)
Dept: HEART AND VASCULAR | Facility: CLINIC | Age: 49
End: 2024-04-17

## 2024-05-22 ENCOUNTER — APPOINTMENT (OUTPATIENT)
Dept: HEART AND VASCULAR | Facility: CLINIC | Age: 49
End: 2024-05-22

## 2024-06-26 ENCOUNTER — APPOINTMENT (OUTPATIENT)
Dept: HEART AND VASCULAR | Facility: CLINIC | Age: 49
End: 2024-06-26

## 2024-07-31 ENCOUNTER — APPOINTMENT (OUTPATIENT)
Dept: HEART AND VASCULAR | Facility: CLINIC | Age: 49
End: 2024-07-31

## 2024-08-16 NOTE — CHART NOTE - NSCHARTNOTEFT_GEN_A_CORE
----- Message from Kwasi Hunter MD sent at 8/15/2024  8:25 PM CDT -----  08.15.2024  Labs  08.15.2024  Sodium 139, potassium 3.9, chloride 107, bicarbonate 25,  BUN 9 mg/dl, creatinine  0.38 mg/dl, calcium 9.9 mg/dl,   magnesium 2.1 mg/dl, phosphorus 3.9 mg/dl,   total protein 8.1 gm/dl, albumin  4.4 gm/dl  Serum osmolality 292 milliosmoles / kg water   CBC  WBC  6.4  K, HGB  12.8 GM/DL, HCT  38.6 %,  K  Iron 60 microgram/dl  TIBC  433 microgram/dl  Percent Iron saturation  14 %  Ferritin 6 ng/ml   Retic count 42 K/microliter absolute  Retic count  0.9 %   08.15.2024  Urine analysis  08.15.2024  Urine sp gravity 1.006  Urine pH 5.5  Urine negative on dipstick examination  Urine negative on microscopic examination of the urine sediment  Urine sodium 31 meq/liter   Urine creatinine  26.20 mg/dl   Urine protein 16 mg/dl   Urine protein to creatinine ratio 0.61 mg/mg - slightly elevated   Urine osmolality  246 milliosmoles / kg water   Fractional excretion of sodium  0.323 % normal      XR skull - not done         Labs within acceptable limits  Please obtain XR skull as ordered      47-year-old gentleman with a history of hypertension, hyperlipidemia, type 2 diabetes, prior stroke with ?residual left-sided weakness (not on aspirin, details unclear), and possible seizure around the time of his stroke who was admitted after presenting with sudden-onset, severe headache associated with dizziness and syncope/loss of consciousness followed by a period of confusion with gradual improvement in setting of 's. Initial imaging was unrevealing for acute findings but did show evidence of prior chronic strokes as well as scattered ICAD of moderate to severe severity. MRI with + multiple small acute infarcts of the posterior circulation. For now, patient will remain on DAPT. Hypercoag panel sent, antithrombin III resulted at 75 (low). Plan to redraw as an outpatient at follow up. ILR placed. Echo with bubble performed, EF 60%, no PFO. MATT significant for dilated aortic root of 4.4cm and 4cm dilatation of proximal ascending aorta. CT surgery called for evaluation of dilated aortic root and proximal ascending aorta seen on MATT.     Problem one: Dilated aortic root and proximal ascending aorta.   -Discussed MATT results with Dr. Miranda  -MATT significant for dilated aortic root of 4.4cm and 4cm dilatation of proximal ascending aorta.  -pt is to follow up as an outpatient for serial CT scans, no indication for intervention at this time. Pt provided with Dr. Matamoros office card.   -no formal CT surgery consult needed. Office made aware of pt and will be scheduling a follow up with Dr. Miranda   -pt is asymptomatic with no chest pain/pressure/SOB or any further associated symptoms  -CT surgery will sign off at this time, For appointments please call our office at [phone].
